# Patient Record
Sex: MALE | Race: WHITE | NOT HISPANIC OR LATINO | Employment: OTHER | URBAN - METROPOLITAN AREA
[De-identification: names, ages, dates, MRNs, and addresses within clinical notes are randomized per-mention and may not be internally consistent; named-entity substitution may affect disease eponyms.]

---

## 2017-01-16 ENCOUNTER — ALLSCRIPTS OFFICE VISIT (OUTPATIENT)
Dept: OTHER | Facility: OTHER | Age: 47
End: 2017-01-16

## 2017-01-16 LAB
BILIRUB UR QL STRIP: NORMAL
CLARITY UR: NORMAL
COLOR UR: YELLOW
HGB UR QL STRIP.AUTO: NORMAL
KETONES UR STRIP-MCNC: NORMAL MG/DL
LEUKOCYTE ESTERASE UR QL STRIP: NORMAL
NITRITE UR QL STRIP: NORMAL
PH UR STRIP.AUTO: 5 [PH]
PROT UR STRIP-MCNC: NORMAL MG/DL
SP GR UR STRIP.AUTO: 1
UROBILINOGEN UR QL STRIP.AUTO: NORMAL

## 2017-01-17 ENCOUNTER — LAB CONVERSION - ENCOUNTER (OUTPATIENT)
Dept: OTHER | Facility: OTHER | Age: 47
End: 2017-01-17

## 2017-01-17 LAB
BACTERIA UR QL AUTO: NORMAL /HPF
BILIRUB UR QL STRIP: NEGATIVE
COLOR UR: YELLOW
COMMENT (HISTORICAL): CLEAR
FECAL OCCULT BLOOD DIAGNOSTIC (HISTORICAL): NEGATIVE
GLUCOSE (HISTORICAL): NEGATIVE
HYALINE CASTS #/AREA URNS LPF: NORMAL /LPF
KETONES UR STRIP-MCNC: NEGATIVE MG/DL
LEUKOCYTE ESTERASE UR QL STRIP: NEGATIVE
NITRITE UR QL STRIP: NEGATIVE
PH UR STRIP.AUTO: 5.5 [PH] (ref 5–8)
PROT UR STRIP-MCNC: NEGATIVE MG/DL
RBC (HISTORICAL): NORMAL /HPF
SP GR UR STRIP.AUTO: 1.02 (ref 1–1.03)
SQUAMOUS EPITHELIAL CELLS (HISTORICAL): NORMAL /HPF
WBC # BLD AUTO: NORMAL /HPF

## 2017-03-08 ENCOUNTER — GENERIC CONVERSION - ENCOUNTER (OUTPATIENT)
Dept: OTHER | Facility: OTHER | Age: 47
End: 2017-03-08

## 2017-03-28 ENCOUNTER — ALLSCRIPTS OFFICE VISIT (OUTPATIENT)
Dept: OTHER | Facility: OTHER | Age: 47
End: 2017-03-28

## 2017-03-28 DIAGNOSIS — M54.16 RADICULOPATHY OF LUMBAR REGION: ICD-10-CM

## 2017-03-28 DIAGNOSIS — C62.90 MALIGNANT NEOPLASM OF TESTIS (HCC): ICD-10-CM

## 2017-03-28 DIAGNOSIS — M54.50 LOW BACK PAIN: ICD-10-CM

## 2017-04-04 ENCOUNTER — TRANSCRIBE ORDERS (OUTPATIENT)
Dept: ADMINISTRATIVE | Facility: HOSPITAL | Age: 47
End: 2017-04-04

## 2017-04-04 ENCOUNTER — HOSPITAL ENCOUNTER (OUTPATIENT)
Dept: RADIOLOGY | Facility: HOSPITAL | Age: 47
Discharge: HOME | End: 2017-04-04
Attending: ANESTHESIOLOGY
Payer: COMMERCIAL

## 2017-04-04 DIAGNOSIS — M54.50 LOW BACK PAIN: ICD-10-CM

## 2017-04-04 DIAGNOSIS — M54.16 RADICULOPATHY OF LUMBAR REGION: ICD-10-CM

## 2017-04-04 DIAGNOSIS — C62.90 MALIGNANT NEOPLASM OF TESTIS (HCC): ICD-10-CM

## 2017-04-04 PROCEDURE — 72110 X-RAY EXAM L-2 SPINE 4/>VWS: CPT

## 2017-04-04 PROCEDURE — 73502 X-RAY EXAM HIP UNI 2-3 VIEWS: CPT

## 2017-04-24 ENCOUNTER — HOSPITAL ENCOUNTER (OUTPATIENT)
Dept: RADIOLOGY | Facility: HOSPITAL | Age: 47
Discharge: HOME | End: 2017-04-24
Attending: ANESTHESIOLOGY
Payer: COMMERCIAL

## 2017-04-24 DIAGNOSIS — C62.90 MALIGNANT NEOPLASM OF TESTIS (HCC): ICD-10-CM

## 2017-04-24 DIAGNOSIS — M54.16 RADICULOPATHY OF LUMBAR REGION: ICD-10-CM

## 2017-04-24 PROCEDURE — 72148 MRI LUMBAR SPINE W/O DYE: CPT

## 2017-04-25 ENCOUNTER — GENERIC CONVERSION - ENCOUNTER (OUTPATIENT)
Dept: OTHER | Facility: OTHER | Age: 47
End: 2017-04-25

## 2017-04-27 ENCOUNTER — ALLSCRIPTS OFFICE VISIT (OUTPATIENT)
Dept: RADIOLOGY | Facility: CLINIC | Age: 47
End: 2017-04-27
Payer: COMMERCIAL

## 2017-04-27 PROCEDURE — 77003 FLUOROGUIDE FOR SPINE INJECT: CPT | Performed by: ANESTHESIOLOGY

## 2017-05-11 ENCOUNTER — ALLSCRIPTS OFFICE VISIT (OUTPATIENT)
Dept: RADIOLOGY | Facility: CLINIC | Age: 47
End: 2017-05-11
Payer: COMMERCIAL

## 2017-06-06 ENCOUNTER — ALLSCRIPTS OFFICE VISIT (OUTPATIENT)
Dept: OTHER | Facility: OTHER | Age: 47
End: 2017-06-06

## 2017-06-06 ENCOUNTER — GENERIC CONVERSION - ENCOUNTER (OUTPATIENT)
Dept: OTHER | Facility: OTHER | Age: 47
End: 2017-06-06

## 2018-01-10 NOTE — MISCELLANEOUS
Provider Comments  Provider Comments:   no show      Signatures   Electronically signed by : Adama Houston DO; Jun 6 2017  8:45AM EST                       (Author)

## 2018-01-11 NOTE — MISCELLANEOUS
Message   Recorded as Task   Date: 06/06/2017 08:40 AM, Created By: Odilon Lauren   Task Name: Miscellaneous   Assigned To: Odilon Lauren   Regarding Patient: Pedro Arnold, Status: Active   CommentLovena Keepers - 06 Jun 2017 8:40 AM     TASK CREATED  Pt was a No Show for today's visit 6/6/17 @ 8:30   LMOM to C/B to R/S   Chuy Eng - 06 Jun 2017 8:45 AM     TASK REPLIED TO: Previously Assigned To Chuy Eng  aware     Signatures   Electronically signed by : Collins Narvaez, ; Jun 6 2017  9:01AM EST                       (Author)

## 2018-01-13 VITALS
BODY MASS INDEX: 22.68 KG/M2 | HEART RATE: 60 BPM | DIASTOLIC BLOOD PRESSURE: 76 MMHG | HEIGHT: 71 IN | WEIGHT: 162 LBS | SYSTOLIC BLOOD PRESSURE: 110 MMHG

## 2018-01-14 VITALS
DIASTOLIC BLOOD PRESSURE: 70 MMHG | BODY MASS INDEX: 22.68 KG/M2 | HEART RATE: 60 BPM | WEIGHT: 162 LBS | SYSTOLIC BLOOD PRESSURE: 110 MMHG | HEIGHT: 71 IN

## 2018-01-14 NOTE — RESULT NOTES
Message   Recorded as Task   Date: 04/25/2017 10:05 AM, Created By: Jo Kyle   Task Name: Care Coordination   Assigned To: [facility] surgery sched,Team   Regarding Patient: Florin Streeter, Status: Active   Comment:    Chuy Eng - 25 Apr 2017 10:05 AM     TASK CREATED  Patient's MRI revealed disc degeneration L4-5 and L5-S1 foraminal narrowing at L5-S1, how is the patient's symptoms, we will consider LESI Ã? 2   Antonia Zamora - 25 Apr 2017 10:25 AM     TASK EDITED  LMOM to cb on home/ cell  Provided cb number  Amelia Arthur - 25 Apr 2017 1:08 PM     TASK EDITED  pt returning call please call pt back at [phone]   Antonia Zamora - 25 Apr 2017 1:43 PM     TASK EDITED  s/w pt, advised of above  pt in agreement  Scheduled LESI #1 on 4/27, #2 on 5/11  Reviewed pre procedure instructions; eat a light meal - npo 1 hour prior, , loose fitting clothing, cb if illness / abx start prior to procedure, pt verbalized understanding and confirmed advil / ibuprofen  Advised pt - stop advil / ibuprofen 24 hrs prior to procedure  Pt verbalized understanding  will cb w/ questions or concerns       Tasking to surg sched team   Alisha Workman - 25 Apr 2017 2:15 PM     TASK REASSIGNED: Previously Assigned To [facility] sched,Team  dx please   Chuy Eng - 25 Apr 2017 2:18 PM     TASK REPLIED TO: Previously Assigned To Chuy Eng  lumbar foraminal stenosis, lumbar radiculitis

## 2020-03-08 ENCOUNTER — HOSPITAL ENCOUNTER (EMERGENCY)
Facility: HOSPITAL | Age: 50
Discharge: HOME | End: 2020-03-08
Attending: EMERGENCY MEDICINE | Admitting: EMERGENCY MEDICINE
Payer: COMMERCIAL

## 2020-03-08 ENCOUNTER — APPOINTMENT (EMERGENCY)
Dept: CT IMAGING | Facility: HOSPITAL | Age: 50
End: 2020-03-08
Payer: COMMERCIAL

## 2020-03-08 VITALS
TEMPERATURE: 97.3 F | OXYGEN SATURATION: 100 % | HEART RATE: 65 BPM | BODY MASS INDEX: 22.59 KG/M2 | DIASTOLIC BLOOD PRESSURE: 72 MMHG | WEIGHT: 162 LBS | RESPIRATION RATE: 20 BRPM | SYSTOLIC BLOOD PRESSURE: 125 MMHG

## 2020-03-08 DIAGNOSIS — R10.9 LEFT FLANK PAIN: ICD-10-CM

## 2020-03-08 DIAGNOSIS — R31.9 HEMATURIA: ICD-10-CM

## 2020-03-08 DIAGNOSIS — N28.1 RENAL CYST, LEFT: Primary | ICD-10-CM

## 2020-03-08 LAB
ALBUMIN SERPL BCP-MCNC: 3.8 G/DL (ref 3.5–5)
ALP SERPL-CCNC: 58 U/L (ref 46–116)
ALT SERPL W P-5'-P-CCNC: 29 U/L (ref 12–78)
ANION GAP SERPL CALCULATED.3IONS-SCNC: 8 MMOL/L (ref 4–13)
AST SERPL W P-5'-P-CCNC: 25 U/L (ref 5–45)
BASOPHILS # BLD AUTO: 0.05 THOUSANDS/ΜL (ref 0–0.1)
BASOPHILS NFR BLD AUTO: 1 % (ref 0–1)
BILIRUB SERPL-MCNC: 1.6 MG/DL (ref 0.2–1)
BUN SERPL-MCNC: 19 MG/DL (ref 5–25)
CALCIUM SERPL-MCNC: 8.6 MG/DL (ref 8.3–10.1)
CHLORIDE SERPL-SCNC: 102 MMOL/L (ref 100–108)
CLARITY, POC: CLEAR
CO2 SERPL-SCNC: 31 MMOL/L (ref 21–32)
COLOR, POC: ABNORMAL
CREAT SERPL-MCNC: 1.02 MG/DL (ref 0.6–1.3)
EOSINOPHIL # BLD AUTO: 0.07 THOUSAND/ΜL (ref 0–0.61)
EOSINOPHIL NFR BLD AUTO: 1 % (ref 0–6)
ERYTHROCYTE [DISTWIDTH] IN BLOOD BY AUTOMATED COUNT: 12 % (ref 11.6–15.1)
EXT BILIRUBIN, UA: ABNORMAL
EXT BLOOD URINE: ABNORMAL
EXT GLUCOSE, UA: ABNORMAL
EXT KETONES: ABNORMAL
EXT NITRITE, UA: ABNORMAL
EXT PH, UA: 6
EXT PROTEIN, UA: ABNORMAL
EXT SPECIFIC GRAVITY, UA: 1.03
EXT UROBILINOGEN: 0.2
GFR SERPL CREATININE-BSD FRML MDRD: 86 ML/MIN/1.73SQ M
GLUCOSE SERPL-MCNC: 96 MG/DL (ref 65–140)
HCT VFR BLD AUTO: 44.9 % (ref 36.5–49.3)
HGB BLD-MCNC: 15.8 G/DL (ref 12–17)
IMM GRANULOCYTES # BLD AUTO: 0.01 THOUSAND/UL (ref 0–0.2)
IMM GRANULOCYTES NFR BLD AUTO: 0 % (ref 0–2)
LYMPHOCYTES # BLD AUTO: 0.95 THOUSANDS/ΜL (ref 0.6–4.47)
LYMPHOCYTES NFR BLD AUTO: 20 % (ref 14–44)
MCH RBC QN AUTO: 33.2 PG (ref 26.8–34.3)
MCHC RBC AUTO-ENTMCNC: 35.2 G/DL (ref 31.4–37.4)
MCV RBC AUTO: 94 FL (ref 82–98)
MONOCYTES # BLD AUTO: 0.76 THOUSAND/ΜL (ref 0.17–1.22)
MONOCYTES NFR BLD AUTO: 16 % (ref 4–12)
NEUTROPHILS # BLD AUTO: 2.99 THOUSANDS/ΜL (ref 1.85–7.62)
NEUTS SEG NFR BLD AUTO: 62 % (ref 43–75)
NRBC BLD AUTO-RTO: 0 /100 WBCS
PLATELET # BLD AUTO: 221 THOUSANDS/UL (ref 149–390)
PMV BLD AUTO: 9.7 FL (ref 8.9–12.7)
POTASSIUM SERPL-SCNC: 4 MMOL/L (ref 3.5–5.3)
PROT SERPL-MCNC: 7.3 G/DL (ref 6.4–8.2)
RBC # BLD AUTO: 4.76 MILLION/UL (ref 3.88–5.62)
SODIUM SERPL-SCNC: 141 MMOL/L (ref 136–145)
WBC # BLD AUTO: 4.83 THOUSAND/UL (ref 4.31–10.16)
WBC # BLD EST: ABNORMAL 10*3/UL

## 2020-03-08 PROCEDURE — 74176 CT ABD & PELVIS W/O CONTRAST: CPT

## 2020-03-08 PROCEDURE — 99284 EMERGENCY DEPT VISIT MOD MDM: CPT | Performed by: PHYSICIAN ASSISTANT

## 2020-03-08 PROCEDURE — 96360 HYDRATION IV INFUSION INIT: CPT

## 2020-03-08 PROCEDURE — 99284 EMERGENCY DEPT VISIT MOD MDM: CPT

## 2020-03-08 PROCEDURE — 85025 COMPLETE CBC W/AUTO DIFF WBC: CPT | Performed by: EMERGENCY MEDICINE

## 2020-03-08 PROCEDURE — 80053 COMPREHEN METABOLIC PANEL: CPT | Performed by: EMERGENCY MEDICINE

## 2020-03-08 PROCEDURE — 36415 COLL VENOUS BLD VENIPUNCTURE: CPT | Performed by: EMERGENCY MEDICINE

## 2020-03-08 RX ORDER — METHOCARBAMOL 500 MG/1
500 TABLET, FILM COATED ORAL 3 TIMES DAILY
Qty: 20 TABLET | Refills: 0 | Status: SHIPPED | OUTPATIENT
Start: 2020-03-08 | End: 2020-04-28 | Stop reason: ALTCHOICE

## 2020-03-08 RX ORDER — IBUPROFEN 600 MG/1
1 TABLET ORAL DAILY
COMMUNITY
End: 2020-04-28 | Stop reason: ALTCHOICE

## 2020-03-08 RX ADMIN — SODIUM CHLORIDE 1000 ML: 0.9 INJECTION, SOLUTION INTRAVENOUS at 13:42

## 2020-03-08 NOTE — DISCHARGE INSTRUCTIONS
Rest, increase fluids  Take robaxin 3 times a day for muscle spasm    Call urologist tomorrow for further evaluation of hematuria and renal cyst

## 2020-03-08 NOTE — ED PROVIDER NOTES
History  Chief Complaint   Patient presents with    Flank Pain     To ED with c/o left flank pain for one week  States that he has had a weak urinary stream  Denies any fever or chills  Patient is a 53 y/o M that presents to the ED with left flank pain that started 1 week ago  He states a couple days ago he had vomiting and diarrhea and thought  He just pulled a muscle, but now the pain is radiating around to abdomen  Laying on his side makes the pain worse  He has been taking advil for the pain which doesn't seem to help  He denies urinary symptoms  No fevers, chills  History provided by:  Patient  Flank Pain   Pain location:  L flank  Pain quality: aching    Pain radiates to:  LLQ  Pain severity:  Moderate  Onset quality:  Gradual  Duration:  1 week  Timing:  Constant  Progression:  Worsening  Chronicity:  New  Context: not sick contacts, not suspicious food intake and not trauma    Relieved by:  Nothing  Worsened by:  Palpation and position changes  Ineffective treatments:  NSAIDs  Associated symptoms: diarrhea, nausea and vomiting    Associated symptoms: no anorexia, no chest pain, no chills, no constipation, no cough, no dysuria, no fever and no shortness of breath    Risk factors: no alcohol abuse, no aspirin use, not elderly, has not had multiple surgeries, no NSAID use, not obese and no recent hospitalization        Prior to Admission Medications   Prescriptions Last Dose Informant Patient Reported? Taking?   ibuprofen (MOTRIN) 600 mg tablet   Yes No   Sig: Take 1 tablet by mouth daily      Facility-Administered Medications: None       History reviewed  No pertinent past medical history  History reviewed  No pertinent surgical history  History reviewed  No pertinent family history  I have reviewed and agree with the history as documented      E-Cigarette/Vaping    E-Cigarette Use Never User      E-Cigarette/Vaping Substances    Nicotine No     Flavoring No      Social History Tobacco Use    Smoking status: Never Smoker    Smokeless tobacco: Never Used   Substance Use Topics    Alcohol use: Not on file    Drug use: Never       Review of Systems   Constitutional: Negative for chills and fever  HENT: Negative  Respiratory: Negative for cough and shortness of breath  Cardiovascular: Negative for chest pain and leg swelling  Gastrointestinal: Positive for abdominal pain, diarrhea, nausea and vomiting  Negative for anorexia and constipation  Genitourinary: Positive for flank pain  Negative for dysuria  Skin: Negative for color change and rash  Neurological: Negative for dizziness, weakness and numbness  Psychiatric/Behavioral: Negative for confusion  All other systems reviewed and are negative  Physical Exam  Physical Exam   Constitutional: He is oriented to person, place, and time  He appears well-developed and well-nourished  He is cooperative  He does not appear ill  No distress  HENT:   Head: Normocephalic and atraumatic  Nose: Nose normal    Mouth/Throat: Oropharynx is clear and moist and mucous membranes are normal    Eyes: Conjunctivae are normal    Neck: Normal range of motion  Cardiovascular: Normal rate, regular rhythm and normal heart sounds  No murmur heard  Pulmonary/Chest: Effort normal and breath sounds normal  He has no wheezes  He has no rhonchi  He has no rales  Abdominal: Soft  Normal appearance and bowel sounds are normal  There is no tenderness  There is CVA tenderness  Musculoskeletal: Normal range of motion  He exhibits no edema  Thoracic back: He exhibits tenderness  He exhibits no bony tenderness and no swelling  Back:    Neurological: He is alert and oriented to person, place, and time  He has normal strength  No sensory deficit  Gait normal    Skin: Skin is warm and dry  No rash noted  He is not diaphoretic  No pallor  Psychiatric: He has a normal mood and affect     Nursing note and vitals reviewed        Vital Signs  ED Triage Vitals [03/08/20 1230]   Temperature Pulse Respirations Blood Pressure SpO2   (!) 97 3 °F (36 3 °C) 65 20 125/72 100 %      Temp Source Heart Rate Source Patient Position - Orthostatic VS BP Location FiO2 (%)   Tympanic Monitor Sitting Right arm --      Pain Score       7           Vitals:    03/08/20 1230   BP: 125/72   Pulse: 65   Patient Position - Orthostatic VS: Sitting         Visual Acuity      ED Medications  Medications   sodium chloride 0 9 % bolus 1,000 mL (0 mL Intravenous Stopped 3/8/20 1429)       Diagnostic Studies  Results Reviewed     Procedure Component Value Units Date/Time    Comprehensive metabolic panel [[id number]]  (Abnormal) Collected:  03/08/20 1324    Lab Status:  Final result Specimen:  Blood from Arm, Right Updated:  03/08/20 1354     Sodium 141 mmol/L      Potassium 4 0 mmol/L      Chloride 102 mmol/L      CO2 31 mmol/L      ANION GAP 8 mmol/L      BUN 19 mg/dL      Creatinine 1 02 mg/dL      Glucose 96 mg/dL      Calcium 8 6 mg/dL      AST 25 U/L      ALT 29 U/L      Alkaline Phosphatase 58 U/L      Total Protein 7 3 g/dL      Albumin 3 8 g/dL      Total Bilirubin 1 60 mg/dL      eGFR 86 ml/min/1 73sq m     Narrative:       [place] guidelines for Chronic Kidney Disease (CKD):     Stage 1 with normal or high GFR (GFR > 90 mL/min/1 73 square meters)    Stage 2 Mild CKD (GFR = 60-89 mL/min/1 73 square meters)    Stage 3A Moderate CKD (GFR = 45-59 mL/min/1 73 square meters)    Stage 3B Moderate CKD (GFR = 30-44 mL/min/1 73 square meters)    Stage 4 Severe CKD (GFR = 15-29 mL/min/1 73 square meters)    Stage 5 End Stage CKD (GFR <15 mL/min/1 73 square meters)  Note: GFR calculation is accurate only with a steady state creatinine    CBC and differential [[id number]]  (Abnormal) Collected:  03/08/20 1324    Lab Status:  Final result Specimen:  Blood from Arm, Right Updated:  03/08/20 1333     WBC 4 83 Thousand/uL      RBC 4 76 Million/uL      Hemoglobin 15 8 g/dL      Hematocrit 44 9 %      MCV 94 fL      MCH 33 2 pg      MCHC 35 2 g/dL      RDW 12 0 %      MPV 9 7 fL      Platelets 763 Thousands/uL      nRBC 0 /100 WBCs      Neutrophils Relative 62 %      Immat GRANS % 0 %      Lymphocytes Relative 20 %      Monocytes Relative 16 %      Eosinophils Relative 1 %      Basophils Relative 1 %      Neutrophils Absolute 2 99 Thousands/µL      Immature Grans Absolute 0 01 Thousand/uL      Lymphocytes Absolute 0 95 Thousands/µL      Monocytes Absolute 0 76 Thousand/µL      Eosinophils Absolute 0 07 Thousand/µL      Basophils Absolute 0 05 Thousands/µL     POCT urinalysis dipstick [[id number]]  (Abnormal) Resulted:  03/08/20 1331    Lab Status:  Final result Specimen:  Urine Updated:  03/08/20 1332     Color, UA STRAW     Clarity, UA CLEAR     Glucose, UA (Ref: Negative) NEG     Bilirubin, UA (Ref: Negative) NEG     Ketones, UA (Ref: Negative) NEG     Spec Grav, UA (Ref:1 003-1 030) 1 030     Blood, UA (Ref: Negative) LARGE     pH, UA (Ref: 4 5-8 0) 6 0     Protein, UA (Ref: Negative) TRACE     Urobilinogen, UA (Ref: 0 2- 1 0) 0 2      Leukocytes, UA (Ref: Negative) NEG     Nitrite, UA (Ref: Negative) NEG                 CT renal stone study abdomen pelvis without contrast   Final Result by David Ortega MD (03/08 1359)      No evidence of calculi or hydronephrosis  Left renal cyst with some thin linear area of calcification  Left colon diverticulosis without CT evidence of diverticulitis  Contrast-enhanced study could be considered as elective follow-up especially if hematuria persists  The study was marked in EPIC for significant notification              Workstation performed: [id number]                    Procedures  Procedures         ED Course                               MDM  Number of Diagnoses or Management Options  Hematuria: new and requires workup  Left flank pain: new and requires workup  Renal cyst, left: established and worsening  Diagnosis management comments: Patient with left flank pain, will order labs, UA, CT to r/o stone or pyelo  Patient with renal cyst and hematuria, stressed importance of f/u with urology  Will give muscle relaxant since palpation does reproduce symptoms  Amount and/or Complexity of Data Reviewed  Clinical lab tests: ordered and reviewed  Tests in the radiology section of CPT®: ordered and reviewed    Patient Progress  Patient progress: stable        Disposition  Final diagnoses:   Renal cyst, left   Hematuria   Left flank pain     Time reflects when diagnosis was documented in both MDM as applicable and the Disposition within this note     Time User Action Codes Description Comment    3/8/2020  2:07 PM Miguel How Add [N28 1] Renal cyst, left     3/8/2020  2:07 PM Miguel How Add [R31 9] Hematuria     3/8/2020  2:21 PM Miguel How Add [R10 9] Left flank pain       ED Disposition     ED Disposition Condition Date/Time Comment    Discharge Stable Sun Mar 8, 2020  2:21 PM lBake Elliott discharge to home/self care  Follow-up Information     Follow up With Specialties Details Why Contact Info Additional [address] [facility] [place] For Urology [facility] Urology Call in 1 day For recheck or renal cyst and hematuria [address] [id number] Aakash SAM[facility] [id number]  [address]  [facility] For Urology [facility], [facility] [address], [facility], [address] [facility], [facility] [address]          Discharge Medication List as of 3/8/2020  2:22 PM      START taking these medications    Details   methocarbamol (ROBAXIN) 500 mg tablet Take 1 tablet (500 mg total) by mouth 3 (three) times a day, Starting Sun 3/8/2020, Normal         CONTINUE these medications which have NOT CHANGED    Details   ibuprofen (MOTRIN) 600 mg tablet Take 1 tablet by mouth daily, Historical Med           No discharge procedures on file      PDMP Review     None ED Provider  Electronically Signed by           Suzanne Greco PA-C  03/08/20 2167

## 2020-03-09 ENCOUNTER — TELEPHONE (OUTPATIENT)
Dept: UROLOGY | Facility: AMBULATORY SURGERY CENTER | Age: 50
End: 2020-03-09

## 2020-03-09 NOTE — TELEPHONE ENCOUNTER
Reason for appointment/Complaint/Diagnosis :er follow up renal cyst  Insurance: [id number] [address]  History of Cancer? yes                 If yes, what kind? testicular    Previous urologist?   kousari             Records requested/where?  no  Outside testing/where? no  Location Preference for office visit? [facility] or [facility]

## 2020-03-09 NOTE — TELEPHONE ENCOUNTER
Routing to clerical to please call and schedule soon with Dr Donny Francisco or Dr Rosario Braun  Thanks

## 2020-03-10 ENCOUNTER — APPOINTMENT (OUTPATIENT)
Dept: RADIOLOGY | Facility: CLINIC | Age: 50
End: 2020-03-10
Payer: COMMERCIAL

## 2020-03-10 ENCOUNTER — HOSPITAL ENCOUNTER (OUTPATIENT)
Dept: MRI IMAGING | Facility: HOSPITAL | Age: 50
Discharge: HOME | End: 2020-03-10
Attending: FAMILY MEDICINE
Payer: COMMERCIAL

## 2020-03-10 VITALS
BODY MASS INDEX: 22.68 KG/M2 | SYSTOLIC BLOOD PRESSURE: 115 MMHG | WEIGHT: 162 LBS | HEIGHT: 71 IN | DIASTOLIC BLOOD PRESSURE: 70 MMHG

## 2020-03-10 DIAGNOSIS — S76.112A STRAIN OF QUADRICEPS TENDON, LEFT, INITIAL ENCOUNTER: ICD-10-CM

## 2020-03-10 DIAGNOSIS — M16.12 PRIMARY OSTEOARTHRITIS OF ONE HIP, LEFT: ICD-10-CM

## 2020-03-10 DIAGNOSIS — M79.605 LEFT LEG PAIN: ICD-10-CM

## 2020-03-10 DIAGNOSIS — M79.605 LEFT LEG PAIN: Primary | ICD-10-CM

## 2020-03-10 PROCEDURE — 3008F BODY MASS INDEX DOCD: CPT | Performed by: FAMILY MEDICINE

## 2020-03-10 PROCEDURE — 73552 X-RAY EXAM OF FEMUR 2/>: CPT

## 2020-03-10 PROCEDURE — 73721 MRI JNT OF LWR EXTRE W/O DYE: CPT

## 2020-03-10 PROCEDURE — 99204 OFFICE O/P NEW MOD 45 MIN: CPT | Performed by: FAMILY MEDICINE

## 2020-03-10 PROCEDURE — 1036F TOBACCO NON-USER: CPT | Performed by: FAMILY MEDICINE

## 2020-03-10 NOTE — PROGRESS NOTES
Assessment:     1  Left leg pain    2  Strain of quadriceps tendon, left, initial encounter    3  Primary osteoarthritis of one hip, left        Plan:     Problem List Items Addressed This Visit        Musculoskeletal and Integument    Strain of quadriceps tendon, left, initial encounter    Relevant Orders    MRI knee left  wo contrast    Primary osteoarthritis of one hip, left      Other Visit Diagnoses     Left leg pain    -  Primary    Relevant Orders    XR femur 2 vw left    MRI knee left  wo contrast         Subjective:     Patient ID: Mansi Sibley is a 52 y o  male  Chief Complaint:  Patient is a 31-year-old male presenting today for evaluation of left thigh pain  He reports 3 weeks ago while playing soccer, feeling a pop in his left leg while sprinting for the ball  He reported immediate onset of pain along the distal aspect of his thigh  Shortly thereafter he reported enormous amount of swelling and edema followed by bruising just above his kneecap  Since that time he has had difficulty with climbing stairs and also with getting into and out of his truck  Pain continues to be located just above his kneecap and also along the medial aspect of his thigh  Ice and anti-inflammatories have helped with swelling  He states the bruising has now resolved  He denies any knee locking, clicking giving out  He denies any numbness or tingling  Allergy:  No Known Allergies  Medications:  all current active meds have been reviewed  Past Medical History:  History reviewed  No pertinent past medical history  Past Surgical History:  History reviewed  No pertinent surgical history  Family History:  History reviewed  No pertinent family history    Social History:  Social History     Substance and Sexual Activity   Alcohol Use Not on file     Social History     Substance and Sexual Activity   Drug Use Never     Social History     Tobacco Use   Smoking Status Never Smoker   Smokeless Tobacco Never Used     Review of Systems   Constitutional: Negative  HENT: Negative  Eyes: Negative  Respiratory: Negative  Cardiovascular: Negative  Gastrointestinal: Negative  Genitourinary: Negative  Musculoskeletal: Positive for arthralgias and myalgias  Skin: Negative  Allergic/Immunologic: Negative  Neurological: Negative  Hematological: Negative  Psychiatric/Behavioral: Negative  Objective:  BP Readings from Last 1 Encounters:   03/10/20 115/70      Wt Readings from Last 1 Encounters:   03/10/20 73 5 kg (162 lb)      BMI:   Estimated body mass index is 22 59 kg/m² as calculated from the following:    Height as of this encounter: 5' 11" (1 803 m)  Weight as of this encounter: 73 5 kg (162 lb)  BSA:   Estimated body surface area is 1 93 meters squared as calculated from the following:    Height as of this encounter: 5' 11" (1 803 m)  Weight as of this encounter: 73 5 kg (162 lb)  Physical Exam   Constitutional: He appears well-developed  Eyes: Pupils are equal, round, and reactive to light  Neck: Normal range of motion  Pulmonary/Chest: Effort normal    Neurological: He is alert  Skin: Skin is warm  Psychiatric: He has a normal mood and affect  Left Hip Exam     Tenderness   The patient is experiencing no tenderness  Range of Motion   External rotation: normal   Internal rotation: abnormal     Muscle Strength   The patient has normal left hip strength  Tests   LYNN: negative    Other   Erythema: absent  Sensation: normal  Pulse: present    Comments:  Palpable tenderness along the quadriceps tendon  There is a noted voidance along the VMO muscle group            I have personally reviewed pertinent films in PACS

## 2020-03-11 ENCOUNTER — OFFICE VISIT (OUTPATIENT)
Dept: UROLOGY | Facility: MEDICAL CENTER | Age: 50
End: 2020-03-11
Payer: COMMERCIAL

## 2020-03-11 VITALS
BODY MASS INDEX: 22.68 KG/M2 | SYSTOLIC BLOOD PRESSURE: 120 MMHG | HEIGHT: 71 IN | WEIGHT: 162 LBS | HEART RATE: 59 BPM | DIASTOLIC BLOOD PRESSURE: 68 MMHG

## 2020-03-11 DIAGNOSIS — R10.9 FLANK PAIN: ICD-10-CM

## 2020-03-11 DIAGNOSIS — R39.12 WEAK URINARY STREAM: ICD-10-CM

## 2020-03-11 DIAGNOSIS — Z12.5 PROSTATE CANCER SCREENING: ICD-10-CM

## 2020-03-11 DIAGNOSIS — N28.1 RENAL CYST: Primary | ICD-10-CM

## 2020-03-11 DIAGNOSIS — R31.29 MICROSCOPIC HEMATURIA: ICD-10-CM

## 2020-03-11 DIAGNOSIS — Z85.47 HISTORY OF TESTICULAR CANCER: ICD-10-CM

## 2020-03-11 LAB
SL AMB  POCT GLUCOSE, UA: ABNORMAL
SL AMB LEUKOCYTE ESTERASE,UA: ABNORMAL
SL AMB POCT BILIRUBIN,UA: ABNORMAL
SL AMB POCT BLOOD,UA: ABNORMAL
SL AMB POCT CLARITY,UA: CLEAR
SL AMB POCT COLOR,UA: YELLOW
SL AMB POCT KETONES,UA: ABNORMAL
SL AMB POCT NITRITE,UA: ABNORMAL
SL AMB POCT PH,UA: 6
SL AMB POCT SPECIFIC GRAVITY,UA: 1.02
SL AMB POCT URINE PROTEIN: ABNORMAL
SL AMB POCT UROBILINOGEN: 0.2

## 2020-03-11 PROCEDURE — 81003 URINALYSIS AUTO W/O SCOPE: CPT | Performed by: UROLOGY

## 2020-03-11 PROCEDURE — 99244 OFF/OP CNSLTJ NEW/EST MOD 40: CPT | Performed by: UROLOGY

## 2020-03-11 NOTE — PROGRESS NOTES
Assessment/Plan:  1  Left flank pain -probably musculoskeletal   Continue local therapy and therapy as per chiroopractor  2  Microscopic hematuria- my review of the CT without contrast revealed calcification in the region of the mid ureter however more than likely this is outside of the ureter  Because I am unable to see the ureter and because with the patient having microscopic hematuria CT with and without contrast will be repeated to see whether this calcification is indeed in the urinary tract  This will give us another opportune due to look at the lymph node chain in the retroperitoneum and the urinary tract in view of his microscopic blood in the urine  3 Bilateral renal cyst-no intervention  4  History of testicular cancer -patient asked to access his pathology report as well as obtain beta HCG alpha fetoprotein blood levels  5   Prostate cancer screening  The patient will be 48years of age soon and requested PSA and TALA  TALA is not suspicious today with PSA ordered  6  Weakened urinary stream -chronic-plan cystoscopy  No problem-specific Assessment & Plan notes found for this encounter  Diagnoses and all orders for this visit:    Renal cyst  -     POCT urine dip auto non-scope    Microscopic hematuria  -     POCT urine dip auto non-scope    Flank pain  Comments:   left  Orders:  -     POCT urine dip auto non-scope    History of testicular cancer    Prostate cancer screening  Comments:  by patient request          Subjective:      Patient ID: Reynaldo Ferreira is a 52 y o  male  HPI   66-year-old male who in 2000 was 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 at [facility] with the patient receiving radiation therapy to the retroperitoneum  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 however we did discuss the fact that radiation is an odd choice of therapy for a non seminoma this tumor    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  In any event the patient noted that for approximately 2 weeks he had some right flank discomfort exacerbated by movement 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 or even related to exercises in the gym  Pain is worse with twisting at the level of the waist   The patient noted that the pain became severe 4 days ago and presented to the emergency room at which time CT without contrast was performed revealing no evidence of hydronephrosis or renal stones  Incidental finding of simple renal cysts bilaterally was noted  Patient now presents for evaluation flank pain on the left microscopic hematuria history of testicular cancer  The patient is also interested in elective sterilization with a right-sided vasectomy  This will be discussed at a later time  The patient also notes that since the year 2000 he has had somewhat of a weakened urinary  Stream noting a protracted stream as well  Bladder outlet will be evaluated with cystoscopy  The following portions of the patient's history were reviewed and updated as appropriate: allergies, current medications, past family history, past medical history, past social history, past surgical history and problem list     Review of Systems   Respiratory: Negative  Gastrointestinal:         Left flank pain radiating somewhat anteriorly   Musculoskeletal: Positive for back pain and myalgias  Neurological: Negative  Hematological: Negative  Psychiatric/Behavioral: Negative  All other systems reviewed and are negative  Objective:      /68   Pulse 59   Ht 5' 11" (1 803 m)   Wt 73 5 kg (162 lb)   BMI 22 59 kg/m²          Physical Exam   Constitutional: He is oriented to person, place, and time  He appears well-developed and well-nourished  No distress  HENT:   Head: Normocephalic and atraumatic  Eyes: Pupils are equal, round, and reactive to light  EOM are normal    Neck: Neck supple     Cardiovascular: Normal rate  Pulmonary/Chest: Effort normal  No respiratory distress  Abdominal: Soft  He exhibits no distension  No pain or tenderness to percussion  Genitourinary:   Genitourinary Comments: Phallus normal circumcised without cutaneous lesions  Meatus patent normally placed  Scrotum normal without cutaneous lesions  There is a surgically absent left testis with the right testis being nontender without significant adnexal abnormality or palpable mass  TALA normal anal verge normal anal sphincter tone no palpable rectal masses  Prostate 20 g a nodular nontender   Musculoskeletal: Normal range of motion  Neurological: He is alert and oriented to person, place, and time  Skin: Skin is dry  He is not diaphoretic  Psychiatric: He has a normal mood and affect  His behavior is normal  Judgment and thought content normal    Vitals reviewed

## 2020-03-12 ENCOUNTER — OFFICE VISIT (OUTPATIENT)
Dept: OBGYN CLINIC | Facility: CLINIC | Age: 50
End: 2020-03-12
Payer: COMMERCIAL

## 2020-03-12 VITALS
HEIGHT: 71 IN | SYSTOLIC BLOOD PRESSURE: 119 MMHG | BODY MASS INDEX: 22.68 KG/M2 | WEIGHT: 162 LBS | DIASTOLIC BLOOD PRESSURE: 70 MMHG

## 2020-03-12 DIAGNOSIS — S76.112A STRAIN OF QUADRICEPS TENDON, LEFT, INITIAL ENCOUNTER: Primary | ICD-10-CM

## 2020-03-12 PROCEDURE — 3008F BODY MASS INDEX DOCD: CPT | Performed by: FAMILY MEDICINE

## 2020-03-12 PROCEDURE — 1036F TOBACCO NON-USER: CPT | Performed by: FAMILY MEDICINE

## 2020-03-12 PROCEDURE — 99213 OFFICE O/P EST LOW 20 MIN: CPT | Performed by: FAMILY MEDICINE

## 2020-03-12 NOTE — PROGRESS NOTES
Assessment:     1  Strain of quadriceps tendon, left, initial encounter        Plan:     Problem List Items Addressed This Visit        Musculoskeletal and Integument    Strain of quadriceps tendon, left, initial encounter - Primary    Relevant Orders    Ambulatory referral to Physical Therapy         Subjective:     Patient ID: Angelica Holley is a 52 y o  male  Chief Complaint:  Patient presents today for follow-up of 5 pain and MRI results  Patient reports good overall relief of pain in his left thigh  He is now able to fully bend the leg which was unable to do previously  He states that this morning he was able to walk on a roof with no difficulties  He does report at this time some unrelated pain his left lower back not related to his current leg injury  Allergy:  No Known Allergies  Medications:  all current active meds have been reviewed  Past Medical History:  History reviewed  No pertinent past medical history  Past Surgical History:  History reviewed  No pertinent surgical history  Family History:  History reviewed  No pertinent family history  Social History:  Social History     Substance and Sexual Activity   Alcohol Use Not on file     Social History     Substance and Sexual Activity   Drug Use Never     Social History     Tobacco Use   Smoking Status Never Smoker   Smokeless Tobacco Never Used     Review of Systems   Constitutional: Negative  HENT: Negative  Eyes: Negative  Respiratory: Negative  Cardiovascular: Negative  Gastrointestinal: Negative  Genitourinary: Negative  Musculoskeletal: Positive for arthralgias and myalgias  Skin: Negative  Allergic/Immunologic: Negative  Neurological: Negative  Hematological: Negative  Psychiatric/Behavioral: Negative            Objective:  BP Readings from Last 1 Encounters:   03/12/20 119/70      Wt Readings from Last 1 Encounters:   03/12/20 73 5 kg (162 lb)      BMI:   Estimated body mass index is 22 59 kg/m² as calculated from the following:    Height as of this encounter: 5' 11" (1 803 m)  Weight as of this encounter: 73 5 kg (162 lb)  BSA:   Estimated body surface area is 1 93 meters squared as calculated from the following:    Height as of this encounter: 5' 11" (1 803 m)  Weight as of this encounter: 73 5 kg (162 lb)  Physical Exam   Constitutional: He is oriented to person, place, and time  He appears well-developed and well-nourished  HENT:   Head: Normocephalic  Eyes: Pupils are equal, round, and reactive to light  Neck: Normal range of motion  Pulmonary/Chest: Effort normal    Musculoskeletal: Normal range of motion  Neurological: He is alert and oriented to person, place, and time  Skin: Skin is warm  Psychiatric: He has a normal mood and affect  Left Hip Exam     Tenderness   The patient is experiencing no tenderness  Range of Motion   External rotation: normal     Muscle Strength   The patient has normal left hip strength  Tests   LYNN: negative    Other   Erythema: absent  Sensation: normal  Pulse: present    Comments:  Palpable tenderness along the quadriceps tendon  There is a noted voidance along the VMO muscle group            I have personally reviewed pertinent films in PACS  Mild quadriceps insertional tendinosis otherwise intact

## 2020-03-17 ENCOUNTER — OFFICE VISIT (OUTPATIENT)
Dept: FAMILY MEDICINE CLINIC | Facility: CLINIC | Age: 50
End: 2020-03-17
Payer: COMMERCIAL

## 2020-03-17 ENCOUNTER — APPOINTMENT (OUTPATIENT)
Dept: LAB | Facility: CLINIC | Age: 50
End: 2020-03-17
Payer: COMMERCIAL

## 2020-03-17 ENCOUNTER — EVALUATION (OUTPATIENT)
Dept: PHYSICAL THERAPY | Facility: CLINIC | Age: 50
End: 2020-03-17
Payer: COMMERCIAL

## 2020-03-17 VITALS
DIASTOLIC BLOOD PRESSURE: 60 MMHG | SYSTOLIC BLOOD PRESSURE: 118 MMHG | HEART RATE: 68 BPM | RESPIRATION RATE: 16 BRPM | HEIGHT: 71 IN | WEIGHT: 164.5 LBS | BODY MASS INDEX: 23.03 KG/M2

## 2020-03-17 DIAGNOSIS — Z12.5 PROSTATE CANCER SCREENING: ICD-10-CM

## 2020-03-17 DIAGNOSIS — Z00.00 ANNUAL PHYSICAL EXAM: Primary | ICD-10-CM

## 2020-03-17 DIAGNOSIS — Z13.220 SCREENING, LIPID: ICD-10-CM

## 2020-03-17 DIAGNOSIS — Z12.11 ENCOUNTER FOR SCREENING FOR MALIGNANT NEOPLASM OF COLON: ICD-10-CM

## 2020-03-17 DIAGNOSIS — S76.112A STRAIN OF QUADRICEPS TENDON, LEFT, INITIAL ENCOUNTER: ICD-10-CM

## 2020-03-17 DIAGNOSIS — Z85.47 HISTORY OF TESTICULAR CANCER: ICD-10-CM

## 2020-03-17 PROBLEM — M16.12 PRIMARY OSTEOARTHRITIS OF ONE HIP, LEFT: Status: RESOLVED | Noted: 2020-03-10 | Resolved: 2020-03-17

## 2020-03-17 LAB
AFP-TM SERPL-MCNC: 3.5 NG/ML (ref 0.5–8)
ALBUMIN SERPL BCP-MCNC: 3.8 G/DL (ref 3.5–5)
ALP SERPL-CCNC: 57 U/L (ref 46–116)
ALT SERPL W P-5'-P-CCNC: 46 U/L (ref 12–78)
ANION GAP SERPL CALCULATED.3IONS-SCNC: 4 MMOL/L (ref 4–13)
AST SERPL W P-5'-P-CCNC: 25 U/L (ref 5–45)
B-HCG SERPL-ACNC: <2 MIU/ML
BILIRUB SERPL-MCNC: 0.65 MG/DL (ref 0.2–1)
BUN SERPL-MCNC: 24 MG/DL (ref 5–25)
CALCIUM SERPL-MCNC: 8.7 MG/DL (ref 8.3–10.1)
CHLORIDE SERPL-SCNC: 108 MMOL/L (ref 100–108)
CHOLEST SERPL-MCNC: 144 MG/DL (ref 50–200)
CO2 SERPL-SCNC: 29 MMOL/L (ref 21–32)
CREAT SERPL-MCNC: 0.89 MG/DL (ref 0.6–1.3)
GFR SERPL CREATININE-BSD FRML MDRD: 100 ML/MIN/1.73SQ M
GLUCOSE P FAST SERPL-MCNC: 82 MG/DL (ref 65–99)
HDLC SERPL-MCNC: 51 MG/DL
LDLC SERPL CALC-MCNC: 80 MG/DL (ref 0–100)
POTASSIUM SERPL-SCNC: 4.2 MMOL/L (ref 3.5–5.3)
PROT SERPL-MCNC: 7 G/DL (ref 6.4–8.2)
PSA SERPL-MCNC: 0.5 NG/ML (ref 0–4)
SODIUM SERPL-SCNC: 141 MMOL/L (ref 136–145)
TRIGL SERPL-MCNC: 63 MG/DL

## 2020-03-17 PROCEDURE — 84702 CHORIONIC GONADOTROPIN TEST: CPT

## 2020-03-17 PROCEDURE — 36415 COLL VENOUS BLD VENIPUNCTURE: CPT

## 2020-03-17 PROCEDURE — 84153 ASSAY OF PSA TOTAL: CPT

## 2020-03-17 PROCEDURE — 80061 LIPID PANEL: CPT

## 2020-03-17 PROCEDURE — 99396 PREV VISIT EST AGE 40-64: CPT | Performed by: PHYSICIAN ASSISTANT

## 2020-03-17 PROCEDURE — 82105 ALPHA-FETOPROTEIN SERUM: CPT

## 2020-03-17 PROCEDURE — 80053 COMPREHEN METABOLIC PANEL: CPT

## 2020-03-17 PROCEDURE — 97161 PT EVAL LOW COMPLEX 20 MIN: CPT

## 2020-03-17 NOTE — PROGRESS NOTES
ADULT ANNUAL PHYSICAL  [address]  FAMILY PRACTICE    NAME: Ana María Blandon  AGE: 48 y o  SEX: male  : 1970     DATE: 3/17/2020     Assessment and Plan:     Healthy 48year old male    Immunizations and preventive care screenings were discussed with patient today  Appropriate education was printed on patient's after visit summary  Counseling:  Alcohol/drug use: discussed moderation in alcohol intake, the recommendations for healthy alcohol use, and avoidance of illicit drug use  Dental Health: discussed importance of regular tooth brushing, flossing, and dental visits  Injury prevention: discussed safety/seat belts, safety helmets, smoke detectors, carbon dioxide detectors, and smoking near bedding or upholstery  Sexual health: discussed sexually transmitted diseases, partner selection, use of condoms, avoidance of unintended pregnancy, and contraceptive alternatives  · Exercise: the importance of regular exercise/physical activity was discussed  Recommend exercise 3-5 times per week for at least 30 minutes  Return in 1 year (on 3/17/2021)  Chief Complaint:     Chief Complaint   Patient presents with    Physical Exam      History of Present Illness:     Adult Annual Physical   Patient here for a comprehensive physical exam  The patient reports problems - under the care of Dr Jani Rutledge for microscopic hematuria  Diet and Physical Activity  · Diet/Nutrition: well balanced diet  · Exercise: moderate cardiovascular exercise, 3-4 times a week on average and 30-60 minutes on average        Depression Screening  PHQ-9 Depression Screening    PHQ-9:    Frequency of the following problems over the past two weeks:       Little interest or pleasure in doing things:  0 - not at all  Feeling down, depressed, or hopeless:  0 - not at all  PHQ-2 Score:  0       General Health  · Sleep: sleeps well, gets 7-8 hours of sleep on average and gets more than 8 hours of sleep on average  · Hearing: normal - bilateral   · Vision: goes for regular eye exams and most recent eye exam <1 year ago  · Dental: regular dental visits and brushes teeth twice daily   Health  · Symptoms include: none     Review of Systems:     Review of Systems   Constitutional: Negative  HENT: Negative  Eyes: Negative  Respiratory: Negative  Cardiovascular: Negative  Gastrointestinal: Negative  Endocrine: Negative  Genitourinary: Negative  Musculoskeletal: Negative  Skin: Negative  Allergic/Immunologic: Negative  Neurological: Negative  Hematological: Negative  Psychiatric/Behavioral: Negative         Past Medical History:     Past Medical History:   Diagnosis Date    Testicular cancer ([facility] [address] )     Left      Past Surgical History:     Past Surgical History:   Procedure Laterality Date    DENTAL SURGERY      [place] teeth extraction    TESTICLE SURGERY      Left      Family History:     Family History   Problem Relation Age of Onset    Endometriosis Mother     Alcohol abuse Sister     Substance Abuse Neg Hx     Mental illness Neg Hx       Social History:     E-Cigarette/Vaping    E-Cigarette Use Never User      E-Cigarette/Vaping Substances    Nicotine No     Flavoring No      Social History     Socioeconomic History    Marital status: /Civil Union     Spouse name: None    Number of children: None    Years of education: None    Highest education level: None   Occupational History    None   Social Needs    Financial resource strain: None    Food insecurity:     Worry: None     Inability: None    Transportation needs:     Medical: None     Non-medical: None   Tobacco Use    Smoking status: Never Smoker    Smokeless tobacco: Never Used   Substance and Sexual Activity    Alcohol use: Yes     Frequency: Never     Drinks per session: 1 or 2     Binge frequency: Never     Comment: Socially    Drug use: Never    Sexual activity: Yes     Partners: Female   Lifestyle    Physical activity:     Days per week: None     Minutes per session: None    Stress: None   Relationships    Social connections:     Talks on phone: None     Gets together: None     Attends Ca[employer] service: None     Active member of club or organization: None     Attends meetings of clubs or organizations: None     Relationship status: None    Intimate partner violence:     Fear of current or ex partner: None     Emotionally abused: None     Physically abused: None     Forced sexual activity: None   Other Topics Concern    None   Social History Narrative    None      Current Medications:     Current Outpatient Medications   Medication Sig Dispense Refill    ibuprofen (MOTRIN) 600 mg tablet Take 1 tablet by mouth daily      methocarbamol (ROBAXIN) 500 mg tablet Take 1 tablet (500 mg total) by mouth 3 (three) times a day 20 tablet 0    Multiple Vitamin (ONE-A-DAY MENS PO) Take by mouth       No current facility-administered medications for this visit  Allergies:     No Known Allergies   Physical Exam:     /60 (BP Location: Left arm, Patient Position: Sitting, Cuff Size: Standard)   Pulse 68   Resp 16   Ht 5' 10 5" (1 791 m)   Wt 74 6 kg (164 lb 8 oz)   BMI 23 27 kg/m²     Physical Exam   Constitutional: He is oriented to person, place, and time  He appears well-developed and well-nourished  HENT:   Head: Normocephalic and atraumatic  Right Ear: Hearing, tympanic membrane, external ear and ear canal normal    Left Ear: Hearing, tympanic membrane, external ear and ear canal normal    Nose: Nose normal    Mouth/Throat: Oropharynx is clear and moist    Eyes: Pupils are equal, round, and reactive to light  Conjunctivae and EOM are normal    Neck: Normal range of motion  Neck supple  No thyromegaly present  Cardiovascular: Normal rate, regular rhythm, normal heart sounds and intact distal pulses  No murmur heard    Pulmonary/Chest: Effort normal and breath sounds normal  No respiratory distress  He has no wheezes  He has no rales  Abdominal: Soft  Bowel sounds are normal  He exhibits no distension and no mass  There is no tenderness  Musculoskeletal: Normal range of motion  He exhibits no edema  Lymphadenopathy:     He has no cervical adenopathy  Neurological: He is alert and oriented to person, place, and time  He has normal reflexes  No cranial nerve deficit  Skin: Skin is warm and dry  Psychiatric: He has a normal mood and affect   Judgment normal        Gerrijose Rodriguez PA-C  [address]

## 2020-03-17 NOTE — PATIENT INSTRUCTIONS

## 2020-03-17 NOTE — PROGRESS NOTES
PT Evaluation     Today's date: 3/17/2020  Patient name: Sinai Fulton  : 1970  MRN: [id number]  Referring provider: Artur Rizzo DO  Dx:   Encounter Diagnosis     ICD-10-CM    1  Strain of quadriceps tendon, left, initial encounter [id number] Ambulatory referral to Physical Therapy                  Assessment  Assessment details: Pt is a 48 y o  male presenting to PT with L quadriceps strain following injury from soccer game  PT findings include: + TTP of quadriceps, poor SLR, global decreased L LE strength with pain recreated during quad activation and limited knee flexion ROM all consistent with s/p strain of L quad  Pt will benefit from skilled PT to address above impairments to return to PLOF with less restriction and to reach functional goals  Impairments: abnormal muscle firing, abnormal muscle tone, abnormal or restricted ROM, abnormal movement, impaired physical strength, lacks appropriate home exercise program and pain with function  Functional limitations: difficulty with squatting, tall kneeling, running, stair climbingUnderstanding of Dx/Px/POC: good   Prognosis: good    Goals  1  Pt will demonstrate understanding of HEP and POC in order to improve compliance with course of rehab in 2 weeks  2  Pt will demonstrate symmetrical L knee flexion ROM in order to get into tall kneeling position for work in 4 weeks  3  Pt will demonstrate 5/5 hip flexion MMT in order to be able to climb ladders with no restriction at work by d/c  4  Pt will demonstrate 5/5 L hip abduction/extension strength in order to improve L neuromuscular control and return to coaching soccer/jog by d/c  5   Pt will demonstrate 0/10 pain at worst in order to return to working out recreationally and run by d/c      Plan  Patient would benefit from: skilled physical therapy  Planned modality interventions: low level laser therapy  Planned therapy interventions: manual therapy, neuromuscular re-education, patient education, strengthening, stretching, therapeutic activities, therapeutic exercise, transfer training, home exercise program, functional ROM exercises and flexibility  Frequency: 2x week  Duration in weeks: 8  Treatment plan discussed with: patient        Subjective Evaluation    History of Present Illness  Mechanism of injury: Pt reports that on  he had a soccer game  Pt specifically reports it being a cold day  He thought that the game would be indoors however, noted that it was outdoors which he did not dress for  He had little time to warm up or stretch  In the middle of the game while sprinting, he had episode of sharp L quad pain and subsequent buckling of L leg  Pt fell forward, noted immediate bruising and swelling into L quad  Pt states that bruising took 2 weeks to go down  He continues to note a lump and "scar tissue" that is palpable  He states that physician reports no rupture  Quality of life: good    Pain  Current pain ratin  At best pain ratin  At worst pain ratin  Location: L quad  Quality: dull ache and pulling  Relieving factors: ice, relaxation and medications  Aggravating factors: stair climbing, lifting and running (squatting, lunging)  Progression: improved      Diagnostic Tests  MRI studies: normal  Treatments  Previous treatment: medication  Patient Goals  Patient goals for therapy: decreased pain, return to work, return to sport/leisure activities, increased motion and increased strength  Patient goal: Pt would like to squat, get on knees, get into tall kneeling position for work          Objective    Knee ROM (R/L):  Knee Flex: 153°/133°  Knee Ext: WNL/WNL    Patellar Mobility:  Normal sup/inf/med/lat B    Knee Effusion Stroke Test:  R 0  L 0    Strength (R;L)  Knee Flex: 5/5; 3+/5 (pain)  Knee Ext: 5/5; 4+/5  Hip ABD: 5/5; 4+/5  Hip Ext: 5/5; 4/5  Hip Flex: 5/5; 3+/5 (pain)  Hip IR: 5/5; 5/5  Hip ER: 5/5; 5/5     Palpation:  TTP + distal rectus femoris, vastus intermedius with palpable area of tension  Special tests:  Dolly Henry: + B  Ramos: + B   Prone knee Flex (R/L): 130°/118° (pain recreation)    Quad Set: GOOD  SLR: unable (painful)           Precautions: None      Manual  3/17            STM rectus fem, vastus lateralis             L PROM knee flex                                                        Exercise Diary  3/17            Seated march demo            Supine bridge demo            Prone hip ext demo            Supine heel slide demo            LAQ demo            Clamshell             Tband HS curl             Standing hip abduction             S/L hip abduction             Elliptical Warm up             STS             Standing SLR                                                                                                                         Modalities              prn

## 2020-03-19 ENCOUNTER — APPOINTMENT (OUTPATIENT)
Dept: PHYSICAL THERAPY | Facility: CLINIC | Age: 50
End: 2020-03-19
Payer: COMMERCIAL

## 2020-03-19 ENCOUNTER — HOSPITAL ENCOUNTER (OUTPATIENT)
Dept: CT IMAGING | Facility: HOSPITAL | Age: 50
Discharge: HOME | End: 2020-03-19
Attending: UROLOGY
Payer: COMMERCIAL

## 2020-03-19 DIAGNOSIS — R10.9 FLANK PAIN: ICD-10-CM

## 2020-03-19 DIAGNOSIS — R31.29 MICROSCOPIC HEMATURIA: ICD-10-CM

## 2020-03-19 PROCEDURE — 74178 CT ABD&PLV WO CNTR FLWD CNTR: CPT

## 2020-03-19 RX ADMIN — IOHEXOL 100 ML: 350 INJECTION, SOLUTION INTRAVENOUS at 11:45

## 2020-03-26 ENCOUNTER — APPOINTMENT (OUTPATIENT)
Dept: PHYSICAL THERAPY | Facility: CLINIC | Age: 50
End: 2020-03-26
Payer: COMMERCIAL

## 2020-03-31 ENCOUNTER — APPOINTMENT (OUTPATIENT)
Dept: PHYSICAL THERAPY | Facility: CLINIC | Age: 50
End: 2020-03-31
Payer: COMMERCIAL

## 2020-04-13 ENCOUNTER — TELEPHONE (OUTPATIENT)
Dept: OBGYN CLINIC | Facility: CLINIC | Age: 50
End: 2020-04-13

## 2020-04-13 ENCOUNTER — OFFICE VISIT (OUTPATIENT)
Dept: PHYSICAL THERAPY | Facility: CLINIC | Age: 50
End: 2020-04-13

## 2020-04-13 DIAGNOSIS — S76.112A STRAIN OF QUADRICEPS TENDON, LEFT, INITIAL ENCOUNTER: Primary | ICD-10-CM

## 2020-04-20 ENCOUNTER — HOSPITAL ENCOUNTER (OUTPATIENT)
Dept: RADIOLOGY | Facility: HOSPITAL | Age: 50
Discharge: HOME | End: 2020-04-20
Attending: FAMILY MEDICINE
Payer: COMMERCIAL

## 2020-04-20 ENCOUNTER — OFFICE VISIT (OUTPATIENT)
Dept: OBGYN CLINIC | Facility: CLINIC | Age: 50
End: 2020-04-20
Payer: COMMERCIAL

## 2020-04-20 VITALS
BODY MASS INDEX: 23.1 KG/M2 | HEIGHT: 71 IN | DIASTOLIC BLOOD PRESSURE: 80 MMHG | SYSTOLIC BLOOD PRESSURE: 138 MMHG | WEIGHT: 165 LBS | TEMPERATURE: 96.7 F

## 2020-04-20 DIAGNOSIS — S76.112A STRAIN OF QUADRICEPS TENDON, LEFT, INITIAL ENCOUNTER: ICD-10-CM

## 2020-04-20 DIAGNOSIS — S76.112A STRAIN OF QUADRICEPS TENDON, LEFT, INITIAL ENCOUNTER: Primary | ICD-10-CM

## 2020-04-20 PROCEDURE — 99213 OFFICE O/P EST LOW 20 MIN: CPT | Performed by: FAMILY MEDICINE

## 2020-04-20 PROCEDURE — 73718 MRI LOWER EXTREMITY W/O DYE: CPT

## 2020-04-20 PROCEDURE — 3008F BODY MASS INDEX DOCD: CPT | Performed by: FAMILY MEDICINE

## 2020-04-20 PROCEDURE — 1036F TOBACCO NON-USER: CPT | Performed by: FAMILY MEDICINE

## 2020-04-21 ENCOUNTER — TELEPHONE (OUTPATIENT)
Dept: OBGYN CLINIC | Facility: HOSPITAL | Age: 50
End: 2020-04-21

## 2020-04-24 ENCOUNTER — OFFICE VISIT (OUTPATIENT)
Dept: OBGYN CLINIC | Facility: MEDICAL CENTER | Age: 50
End: 2020-04-24
Payer: COMMERCIAL

## 2020-04-24 VITALS
HEART RATE: 59 BPM | TEMPERATURE: 98.2 F | DIASTOLIC BLOOD PRESSURE: 74 MMHG | WEIGHT: 165 LBS | SYSTOLIC BLOOD PRESSURE: 118 MMHG | HEIGHT: 71 IN | BODY MASS INDEX: 23.1 KG/M2

## 2020-04-24 DIAGNOSIS — S76.112A STRAIN OF QUADRICEPS TENDON, LEFT, INITIAL ENCOUNTER: Primary | ICD-10-CM

## 2020-04-24 DIAGNOSIS — T14.8XXA HEMATOMA: ICD-10-CM

## 2020-04-24 PROCEDURE — 99243 OFF/OP CNSLTJ NEW/EST LOW 30: CPT | Performed by: ORTHOPAEDIC SURGERY

## 2020-04-28 ENCOUNTER — OFFICE VISIT (OUTPATIENT)
Dept: FAMILY MEDICINE CLINIC | Facility: CLINIC | Age: 50
End: 2020-04-28
Payer: COMMERCIAL

## 2020-04-28 VITALS
HEIGHT: 70 IN | SYSTOLIC BLOOD PRESSURE: 120 MMHG | HEART RATE: 76 BPM | RESPIRATION RATE: 16 BRPM | BODY MASS INDEX: 23.84 KG/M2 | DIASTOLIC BLOOD PRESSURE: 60 MMHG | WEIGHT: 166.5 LBS

## 2020-04-28 DIAGNOSIS — S76.912D: Primary | ICD-10-CM

## 2020-04-28 PROCEDURE — 3008F BODY MASS INDEX DOCD: CPT | Performed by: PHYSICIAN ASSISTANT

## 2020-04-28 PROCEDURE — 99213 OFFICE O/P EST LOW 20 MIN: CPT | Performed by: PHYSICIAN ASSISTANT

## 2020-04-28 PROCEDURE — 1036F TOBACCO NON-USER: CPT | Performed by: PHYSICIAN ASSISTANT

## 2020-05-07 ENCOUNTER — OFFICE VISIT (OUTPATIENT)
Dept: PHYSICAL THERAPY | Facility: CLINIC | Age: 50
End: 2020-05-07
Payer: COMMERCIAL

## 2020-05-07 DIAGNOSIS — S76.112A STRAIN OF QUADRICEPS TENDON, LEFT, INITIAL ENCOUNTER: Primary | ICD-10-CM

## 2020-05-07 PROCEDURE — 97110 THERAPEUTIC EXERCISES: CPT

## 2020-05-07 PROCEDURE — 97112 NEUROMUSCULAR REEDUCATION: CPT

## 2020-05-21 ENCOUNTER — TELEPHONE (OUTPATIENT)
Dept: OBGYN CLINIC | Facility: MEDICAL CENTER | Age: 50
End: 2020-05-21

## 2021-02-09 DIAGNOSIS — Z12.11 SCREEN FOR COLON CANCER: Primary | ICD-10-CM

## 2021-02-09 RX ORDER — SODIUM, POTASSIUM,MAG SULFATES 17.5-3.13G
SOLUTION, RECONSTITUTED, ORAL ORAL
Qty: 2 BOTTLE | Refills: 0 | Status: SHIPPED | OUTPATIENT
Start: 2021-02-09 | End: 2021-02-17 | Stop reason: ALTCHOICE

## 2021-02-09 NOTE — TELEPHONE ENCOUNTER
OA colon scheduled for 02/17/21 with Dr Ascencion Will instructions given verbally/emailed to patient

## 2021-02-11 ENCOUNTER — ANESTHESIA EVENT (OUTPATIENT)
Dept: GASTROENTEROLOGY | Facility: AMBULARY SURGERY CENTER | Age: 51
End: 2021-02-11

## 2021-02-17 ENCOUNTER — HOSPITAL ENCOUNTER (OUTPATIENT)
Dept: GASTROENTEROLOGY | Facility: AMBULARY SURGERY CENTER | Age: 51
Setting detail: OUTPATIENT SURGERY
Discharge: HOME | End: 2021-02-17
Attending: INTERNAL MEDICINE
Payer: COMMERCIAL

## 2021-02-17 ENCOUNTER — ANESTHESIA (OUTPATIENT)
Dept: GASTROENTEROLOGY | Facility: AMBULARY SURGERY CENTER | Age: 51
End: 2021-02-17
Payer: COMMERCIAL

## 2021-02-17 VITALS
HEART RATE: 62 BPM | SYSTOLIC BLOOD PRESSURE: 98 MMHG | BODY MASS INDEX: 23.64 KG/M2 | WEIGHT: 156 LBS | OXYGEN SATURATION: 100 % | HEIGHT: 68 IN | TEMPERATURE: 97.8 F | DIASTOLIC BLOOD PRESSURE: 55 MMHG | RESPIRATION RATE: 17 BRPM

## 2021-02-17 VITALS — HEART RATE: 62 BPM

## 2021-02-17 DIAGNOSIS — Z12.11 SCREEN FOR COLON CANCER: ICD-10-CM

## 2021-02-17 PROBLEM — N28.1 RENAL CYST: Status: ACTIVE | Noted: 2021-02-17

## 2021-02-17 PROBLEM — C62.90 TESTICULAR CANCER (HCC): Status: ACTIVE | Noted: 2021-02-17

## 2021-02-17 PROCEDURE — G0121 COLON CA SCRN NOT HI RSK IND: HCPCS | Performed by: INTERNAL MEDICINE

## 2021-02-17 RX ORDER — PROPOFOL 10 MG/ML
INJECTION, EMULSION INTRAVENOUS AS NEEDED
Status: DISCONTINUED | OUTPATIENT
Start: 2021-02-17 | End: 2021-02-17

## 2021-02-17 RX ORDER — LIDOCAINE HYDROCHLORIDE 10 MG/ML
INJECTION, SOLUTION EPIDURAL; INFILTRATION; INTRACAUDAL; PERINEURAL AS NEEDED
Status: DISCONTINUED | OUTPATIENT
Start: 2021-02-17 | End: 2021-02-17

## 2021-02-17 RX ORDER — SODIUM CHLORIDE, SODIUM LACTATE, POTASSIUM CHLORIDE, CALCIUM CHLORIDE 600; 310; 30; 20 MG/100ML; MG/100ML; MG/100ML; MG/100ML
125 INJECTION, SOLUTION INTRAVENOUS CONTINUOUS
Status: DISCONTINUED | OUTPATIENT
Start: 2021-02-17 | End: 2021-02-21 | Stop reason: HOSPADM

## 2021-02-17 RX ORDER — PROPOFOL 10 MG/ML
INJECTION, EMULSION INTRAVENOUS CONTINUOUS PRN
Status: DISCONTINUED | OUTPATIENT
Start: 2021-02-17 | End: 2021-02-17

## 2021-02-17 RX ORDER — LIDOCAINE HYDROCHLORIDE 10 MG/ML
0.5 INJECTION, SOLUTION EPIDURAL; INFILTRATION; INTRACAUDAL; PERINEURAL ONCE AS NEEDED
Status: DISCONTINUED | OUTPATIENT
Start: 2021-02-17 | End: 2021-02-21 | Stop reason: HOSPADM

## 2021-02-17 RX ADMIN — LIDOCAINE HYDROCHLORIDE 50 MG: 10 INJECTION, SOLUTION EPIDURAL; INFILTRATION; INTRACAUDAL at 09:05

## 2021-02-17 RX ADMIN — PROPOFOL 120 MCG/KG/MIN: 10 INJECTION, EMULSION INTRAVENOUS at 09:05

## 2021-02-17 RX ADMIN — SODIUM CHLORIDE, SODIUM LACTATE, POTASSIUM CHLORIDE, AND CALCIUM CHLORIDE: .6; .31; .03; .02 INJECTION, SOLUTION INTRAVENOUS at 09:05

## 2021-02-17 RX ADMIN — PROPOFOL 20 MG: 10 INJECTION, EMULSION INTRAVENOUS at 09:14

## 2021-02-17 RX ADMIN — PROPOFOL 120 MG: 10 INJECTION, EMULSION INTRAVENOUS at 09:05

## 2021-02-17 RX ADMIN — PROPOFOL 30 MG: 10 INJECTION, EMULSION INTRAVENOUS at 09:06

## 2021-02-17 NOTE — ANESTHESIA PREPROCEDURE EVALUATION
Procedure:  COLONOSCOPY    Relevant Problems   ANESTHESIA (within normal limits)      CARDIO (within normal limits)      ENDO (within normal limits)      GI/HEPATIC (within normal limits)      /RENAL   (+) Renal cyst      HEMATOLOGY (within normal limits)      MUSCULOSKELETAL (within normal limits)      NEURO/PSYCH (within normal limits)      PULMONARY (within normal limits)      Other   (+) Testicular cancer [facility])        Physical Exam    Airway    Mallampati score: I  TM Distance: >3 FB  Neck ROM: full     Dental   No notable dental hx     Cardiovascular      Pulmonary      Other Findings        Anesthesia Plan  ASA Score- 2     Anesthesia Type- IV sedation with anesthesia with ASA Monitors  Additional Monitors:   Airway Plan:           Plan Factors-Exercise tolerance (METS): >4 METS  Chart reviewed  Existing labs reviewed  Patient summary reviewed  Patient is not a current smoker  Induction-     Postoperative Plan-     Informed Consent- Anesthetic plan and risks discussed with patient  I personally reviewed this patient with the CRNA  Discussed and agreed on the Anesthesia Plan with the CRNA  Pancho Barclay

## 2021-02-17 NOTE — ANESTHESIA POSTPROCEDURE EVALUATION
Post-Op Assessment Note    CV Status:  Stable  Pain Score: 0    Pain management: adequate     Mental Status:  Awake and lethargic   Hydration Status:  Euvolemic   PONV Controlled:  None   Airway Patency:  Patent      Post Op Vitals Reviewed: Yes      Staff: Anesthesiologist         No complications documented      BP (!) 82/43 (02/17/21 0919)    Temp 97 8 °F (36 6 °C) (02/17/21 0919)    Pulse 62 (02/17/21 0919)   Resp 17 (02/17/21 0919)    SpO2 98 % (02/17/21 0919)

## 2021-02-17 NOTE — DISCHARGE INSTRUCTIONS
Diverticulosis   WHAT YOU NEED TO KNOW:   Diverticulosis is a condition that causes small pockets called diverticula to form in your intestine  These pockets make it difficult for bowel movements to pass through your digestive system  DISCHARGE INSTRUCTIONS:   Seek care immediately if:   · You have severe pain on the left side of your lower abdomen  · Your bowel movements are bright or dark red  Contact your healthcare provider if:   · You have a fever and chills  · You feel dizzy or lightheaded  · You have nausea, or you are vomiting  · You have a change in your bowel movements  · You have questions or concerns about your condition or care  Medicines:   · Medicines  to soften your bowel movements may be given  You may also need medicines to treat symptoms such as bloating and pain  · Take your medicine as directed  Contact your healthcare provider if you think your medicine is not helping or if you have side effects  Tell him or her if you are allergic to any medicine  Keep a list of the medicines, vitamins, and herbs you take  Include the amounts, and when and why you take them  Bring the list or the pill bottles to follow-up visits  Carry your medicine list with you in case of an emergency  Self-care: The goal of treatment is to manage any symptoms you have and prevent other problems such as diverticulitis  Diverticulitis is swelling or infection of the diverticula  Your healthcare provider may recommend any of the following:  · Eat a variety of high-fiber foods  High-fiber foods help you have regular bowel movements  High-fiber foods include cooked beans, fruits, vegetables, and some cereals  Most adults need 25 to 35 grams of fiber each day  Your healthcare provider may recommend that you have more  Ask your healthcare provider how much fiber you need  Increase fiber slowly  You may have abdominal discomfort, bloating, and gas if you add fiber to your diet too quickly   You may need to take a fiber supplement if you are not getting enough fiber from food  · Drink liquids as directed  You may need to drink 2 to 3 liters (8 to 12 cups) of liquids every day  Ask your healthcare provider how much liquid to drink each day and which liquids are best for you  · Apply heat  on your abdomen for 20 to 30 minutes every 2 hours for as many days as directed  Heat helps decrease pain and muscle spasms  Help prevent diverticulitis or other symptoms: The following may help decrease your risk for diverticulitis or symptoms, such as bleeding  Talk to your provider about these or other things you can do to prevent problems that may occur with diverticulosis  · Exercise regularly  Ask your healthcare provider about the best exercise plan for you  Exercise can help you have regular bowel movements  Get 30 minutes of exercise on most days of the week  · Maintain a healthy weight  Ask your healthcare provider how much you should weigh  Ask him or her to help you create a weight loss plan if you are overweight  · Do not smoke  Nicotine and other chemicals in cigarettes increase your risk for diverticulitis  Ask your healthcare provider for information if you currently smoke and need help to quit  E-cigarettes or smokeless tobacco still contain nicotine  Talk to your healthcare provider before you use these products  · Ask your healthcare provider if it is safe to take NSAIDs  NSAIDs may increase your risk of diverticulitis  Follow up with your healthcare provider as directed:  Write down your questions so you remember to ask them during your visits  © Copyright [address] Information is for End User's use only and may not be sold, redistributed or otherwise used for commercial purposes  All illustrations and images included in CareNotes® are the copyrighted property of [employer]  or [facility] [employer]uro [employer]apps   The above information is an  only   It is not intended as medical advice for individual conditions or treatments  Talk to your doctor, nurse or pharmacist before following any medical regimen to see if it is safe and effective for you

## 2021-02-17 NOTE — H&P
History and Physical -  [facility]  Randy Jaime 48 y o  male MRN: [id number]                  HPI: Randy Jaime is a 48y o  year old male who presents for colonsocopy      REVIEW OF SYSTEMS: Per the HPI, and otherwise unremarkable      Historical Information   Past Medical History:   Diagnosis Date    Renal cyst     Testicular cancer ([address] )     Left    Vertigo      Past Surgical History:   Procedure Laterality Date    DENTAL SURGERY      [place] teeth extraction    TESTICLE SURGERY      Left     Social History   Social History     Substance and Sexual Activity   Alcohol Use Yes    Frequency: Never    Drinks per session: 1 or 2    Binge frequency: Never     Social History     Substance and Sexual Activity   Drug Use Never     Social History     Tobacco Use   Smoking Status Never Smoker   Smokeless Tobacco Never Used     Family History   Problem Relation Age of Onset    Endometriosis Mother     Cervical cancer Mother     Osteoporosis Mother     Testicular cancer Father     Lymphoma Father     Alcohol abuse Sister     Osteoporosis Sister     Diabetes Maternal Grandmother     Liver cancer Maternal Uncle     Cancer Maternal Uncle 68        colon    Liver cancer Paternal Uncle     Cancer Paternal Uncle 76        colon    Substance Abuse Neg Hx     Mental illness Neg Hx        Meds/Allergies       Current Outpatient Medications:     Multiple Vitamin (ONE-A-DAY MENS PO)    Cholecalciferol (VITAMIN D-3 PO)    Cyanocobalamin (VITAMIN B-12 PO)    Current Facility-Administered Medications:     lactated ringers infusion, 125 mL/hr, Intravenous, Continuous    lidocaine (PF) (XYLOCAINE-MPF) 1 % injection 0 5 mL, 0 5 mL, Infiltration, Once PRN    No Known Allergies    Objective     /69   Pulse 70   Temp (!) 96 9 °F (36 1 °C) (Temporal)   Resp 18   Ht 5' 8" (1 727 m)   Wt 70 8 kg (156 lb)   SpO2 99%   BMI 23 72 kg/m²       PHYSICAL EXAM    Gen: NAD  CV: RRR  CHEST: Clear  ABD: soft, NT/ND  EXT: no edema      ASSESSMENT/PLAN:  This is a 48y o  year old male here for colonoscopy, and he is stable and optimized for his procedure

## 2021-02-23 ENCOUNTER — OFFICE VISIT (OUTPATIENT)
Dept: FAMILY MEDICINE CLINIC | Facility: CLINIC | Age: 51
End: 2021-02-23
Payer: COMMERCIAL

## 2021-02-23 VITALS
BODY MASS INDEX: 23.72 KG/M2 | WEIGHT: 169.4 LBS | SYSTOLIC BLOOD PRESSURE: 120 MMHG | HEIGHT: 71 IN | HEART RATE: 70 BPM | RESPIRATION RATE: 16 BRPM | DIASTOLIC BLOOD PRESSURE: 80 MMHG

## 2021-02-23 DIAGNOSIS — Z13.29 SCREENING FOR THYROID DISORDER: ICD-10-CM

## 2021-02-23 DIAGNOSIS — Z12.5 SCREENING FOR PROSTATE CANCER: ICD-10-CM

## 2021-02-23 DIAGNOSIS — Z13.1 SCREENING FOR DIABETES MELLITUS: ICD-10-CM

## 2021-02-23 DIAGNOSIS — Z13.220 SCREENING FOR CHOLESTEROL LEVEL: ICD-10-CM

## 2021-02-23 DIAGNOSIS — Z00.00 ANNUAL PHYSICAL EXAM: Primary | ICD-10-CM

## 2021-02-23 DIAGNOSIS — Z13.0 SCREENING FOR BLOOD DISEASE: ICD-10-CM

## 2021-02-23 DIAGNOSIS — Z13.228 SCREENING FOR METABOLIC DISORDER: ICD-10-CM

## 2021-02-23 PROCEDURE — 1036F TOBACCO NON-USER: CPT | Performed by: FAMILY MEDICINE

## 2021-02-23 PROCEDURE — 3725F SCREEN DEPRESSION PERFORMED: CPT | Performed by: FAMILY MEDICINE

## 2021-02-23 PROCEDURE — 99396 PREV VISIT EST AGE 40-64: CPT | Performed by: FAMILY MEDICINE

## 2021-02-23 PROCEDURE — 3008F BODY MASS INDEX DOCD: CPT | Performed by: FAMILY MEDICINE

## 2021-02-23 RX ORDER — METHOCARBAMOL 500 MG/1
TABLET, FILM COATED ORAL
COMMUNITY
End: 2021-02-23 | Stop reason: ALTCHOICE

## 2021-02-23 NOTE — PATIENT INSTRUCTIONS

## 2021-02-23 NOTE — PROGRESS NOTES
ADULT ANNUAL PHYSICAL  [address]  FAMILY PRACTICE    NAME: Randy Jaime  AGE: 48 y o  SEX: male  : 1970     DATE: 2021     Assessment and Plan:     1  Annual physical exam  - will obtain screening labs and call with the results  - CBC and differential; Future  - Lipid Panel with Direct LDL reflex; Future  - Comprehensive metabolic panel; Future  - PSA, Total Screen; Future  - TSH, 3rd generation with Free T4 reflex; Future    2  Left flank pain  - like musculoskeletal, advised rest and avoid lifting weights overhead, discussed CT abdomen/ pelvis if symptoms not better    Immunizations and preventive care screenings were discussed with patient today  Appropriate education was printed on patient's after visit summary  Counseling:  Alcohol/drug use: discussed moderation in alcohol intake, the recommendations for healthy alcohol use, and avoidance of illicit drug use  Dental Health: discussed importance of regular tooth brushing, flossing, and dental visits  Injury prevention: discussed safety/seat belts, safety helmets, smoke detectors, carbon dioxide detectors, and smoking near bedding or upholstery  · Exercise: the importance of regular exercise/physical activity was discussed  Recommend exercise 3-5 times per week for at least 30 minutes  Return for annual physical in 1 year or sooner as needed  Chief Complaint:     Chief Complaint   Patient presents with    Physical Exam      History of Present Illness:     Adult Annual Physical   Patient here for a comprehensive physical exam  The patient reports recurrence of left upper abdomen/ flank pain for about a month after he went back to the gym and started lifting weights overhead  His pain exacerbated by movement, like bending, twisting or lifting, not related to meals, no n/v/d/c, no melena/ hematochezia, no urinary frequency/ urgency or hematuria   He had colonoscopy on 02/17/21 which was unremarkable  Patient had similar symptoms about a year ago after lifting weights, he was seen in ER on 03/08/20 and his CT abdomen was unremarkable  His pain gradually went away after he stopped lifting weights  Diet and Physical Activity  · Diet/Nutrition: well balanced diet  · Exercise: 3-4 times a week on average  Depression Screening  PHQ-9 Depression Screening    PHQ-9:   Frequency of the following problems over the past two weeks:      Little interest or pleasure in doing things: 0 - not at all  Feeling down, depressed, or hopeless: 0 - not at all  PHQ-2 Score: 0       General Health  · Sleep: sleeps well  · Hearing: normal - bilateral   · Vision: most recent eye exam >1 year ago  · Dental: regular dental visits   Health  · Symptoms include: none     Review of Systems:     Review of Systems   Constitutional: Negative for appetite change, chills, fatigue, fever and unexpected weight change  HENT: Negative for congestion, ear discharge, ear pain, nosebleeds, rhinorrhea, sore throat and trouble swallowing  Eyes: Negative for pain, discharge, redness, itching and visual disturbance  Respiratory: Negative for cough, shortness of breath and wheezing  Cardiovascular: Negative for chest pain, palpitations and leg swelling  Gastrointestinal: Negative for abdominal pain, blood in stool, constipation, diarrhea, nausea and vomiting  Endocrine: Negative for cold intolerance, heat intolerance, polydipsia, polyphagia and polyuria  Genitourinary: Negative for dysuria, frequency, hematuria, penile pain, testicular pain and urgency  Musculoskeletal: Negative for gait problem, joint swelling and myalgias  Left flank pain   Skin: Negative for rash  Neurological: Negative for dizziness, syncope, weakness, numbness and headaches  Hematological: Negative for adenopathy  Psychiatric/Behavioral: Negative for dysphoric mood and sleep disturbance   The patient is not nervous/anxious         Past Medical History:     Past Medical History:   Diagnosis Date    Renal cyst     Testicular cancer ([address] )     Left    Vertigo       Past Surgical History:     Past Surgical History:   Procedure Laterality Date    DENTAL SURGERY      [place] teeth extraction    TESTICLE SURGERY      Left      Family History:     Family History   Problem Relation Age of Onset    Endometriosis Mother     Cervical cancer Mother     Osteoporosis Mother     Testicular cancer Father     Lymphoma Father     Alcohol abuse Sister     Osteoporosis Sister     Diabetes Maternal Grandmother     Liver cancer Maternal Uncle     Cancer Maternal Uncle 68        colon    Colon cancer Maternal Uncle     Liver cancer Paternal Uncle     Cancer Paternal Uncle 76        colon    Colon cancer Paternal Uncle     Colon cancer Maternal Grandfather     Substance Abuse Neg Hx     Mental illness Neg Hx       Social History:     E-Cigarette/Vaping    E-Cigarette Use Never User      E-Cigarette/Vaping Substances    Nicotine No     Flavoring No      Social History     Socioeconomic History    Marital status: /Civil Union     Spouse name: None    Number of children: None    Years of education: None    Highest education level: None   Occupational History    None   Social Needs    Financial resource strain: None    Food insecurity     Worry: None     Inability: None    Transportation needs     Medical: None     Non-medical: None   Tobacco Use    Smoking status: Never Smoker    Smokeless tobacco: Never Used   Substance and Sexual Activity    Alcohol use: Yes     Frequency: Never     Drinks per session: 1 or 2     Binge frequency: Never    Drug use: Never    Sexual activity: Yes     Partners: Female   Lifestyle    Physical activity     Days per week: None     Minutes per session: None    Stress: None   Relationships    Social connections     Talks on phone: None     Gets together: None     Attends Restorationist service: None     Active member of club or organization: None     Attends meetings of clubs or organizations: None     Relationship status: None    Intimate partner violence     Fear of current or ex partner: None     Emotionally abused: None     Physically abused: None     Forced sexual activity: None   Other Topics Concern    None   Social History Narrative    Uses seatbelts    Caffeine use    Has smoke detectors      Current Medications:     Current Outpatient Medications   Medication Sig Dispense Refill    Cyanocobalamin (VITAMIN B-12 PO) Take by mouth      Multiple Vitamin (ONE-A-DAY MENS PO) Take by mouth       No current facility-administered medications for this visit  Allergies:     No Known Allergies   Physical Exam:     /80   Pulse 70   Resp 16   Ht 5' 11" (1 803 m)   Wt 76 8 kg (169 lb 6 4 oz)   BMI 23 63 kg/m²     Physical Exam  Constitutional:       General: He is not in acute distress  Appearance: He is well-developed  HENT:      Head: Normocephalic and atraumatic  Right Ear: Tympanic membrane, ear canal and external ear normal       Left Ear: Tympanic membrane, ear canal and external ear normal       Mouth/Throat:      Mouth: Mucous membranes are moist       Pharynx: Oropharynx is clear  Eyes:      General: No scleral icterus  Extraocular Movements: Extraocular movements intact  Conjunctiva/sclera: Conjunctivae normal       Pupils: Pupils are equal, round, and reactive to light  Neck:      Musculoskeletal: Neck supple  Thyroid: No thyromegaly  Vascular: No JVD  Cardiovascular:      Rate and Rhythm: Normal rate and regular rhythm  Heart sounds: Normal heart sounds  No murmur  Pulmonary:      Effort: Pulmonary effort is normal  No respiratory distress  Breath sounds: Normal breath sounds  No wheezing, rhonchi or rales  Abdominal:      General: Bowel sounds are normal  There is no distension        Palpations: Abdomen is soft  There is no mass  Tenderness: There is no abdominal tenderness  There is no right CVA tenderness or left CVA tenderness  Musculoskeletal:      Right lower leg: No edema  Left lower leg: No edema  Comments: No tenderness along left lower ribs   Lymphadenopathy:      Cervical: No cervical adenopathy  Skin:     Findings: No rash  Neurological:      General: No focal deficit present  Mental Status: He is alert and oriented to person, place, and time  Cranial Nerves: No cranial nerve deficit  Psychiatric:         Mood and Affect: Mood normal          Behavior: Behavior normal          Thought Content:  Thought content normal          Judgment: Judgment normal           Katie Fabian MD  [facility]

## 2021-04-19 ENCOUNTER — APPOINTMENT (OUTPATIENT)
Dept: LAB | Facility: CLINIC | Age: 51
End: 2021-04-19
Payer: COMMERCIAL

## 2021-04-19 DIAGNOSIS — Z12.5 SCREENING FOR PROSTATE CANCER: ICD-10-CM

## 2021-04-19 DIAGNOSIS — Z13.29 SCREENING FOR THYROID DISORDER: ICD-10-CM

## 2021-04-19 DIAGNOSIS — Z13.0 SCREENING FOR BLOOD DISEASE: ICD-10-CM

## 2021-04-19 DIAGNOSIS — Z13.220 SCREENING FOR CHOLESTEROL LEVEL: ICD-10-CM

## 2021-04-19 DIAGNOSIS — Z13.1 SCREENING FOR DIABETES MELLITUS: ICD-10-CM

## 2021-04-19 DIAGNOSIS — Z13.228 SCREENING FOR METABOLIC DISORDER: ICD-10-CM

## 2021-04-19 LAB
ALBUMIN SERPL BCP-MCNC: 4.2 G/DL (ref 3.5–5)
ALP SERPL-CCNC: 48 U/L (ref 46–116)
ALT SERPL W P-5'-P-CCNC: 21 U/L (ref 12–78)
ANION GAP SERPL CALCULATED.3IONS-SCNC: 4 MMOL/L (ref 4–13)
AST SERPL W P-5'-P-CCNC: 18 U/L (ref 5–45)
BASOPHILS # BLD AUTO: 0.02 THOUSANDS/ΜL (ref 0–0.1)
BASOPHILS NFR BLD AUTO: 0 % (ref 0–1)
BILIRUB SERPL-MCNC: 2.94 MG/DL (ref 0.2–1)
BUN SERPL-MCNC: 13 MG/DL (ref 5–25)
CALCIUM SERPL-MCNC: 9.6 MG/DL (ref 8.3–10.1)
CHLORIDE SERPL-SCNC: 105 MMOL/L (ref 100–108)
CHOLEST SERPL-MCNC: 168 MG/DL (ref 50–200)
CO2 SERPL-SCNC: 29 MMOL/L (ref 21–32)
CREAT SERPL-MCNC: 1.06 MG/DL (ref 0.6–1.3)
EOSINOPHIL # BLD AUTO: 0.05 THOUSAND/ΜL (ref 0–0.61)
EOSINOPHIL NFR BLD AUTO: 1 % (ref 0–6)
ERYTHROCYTE [DISTWIDTH] IN BLOOD BY AUTOMATED COUNT: 12.2 % (ref 11.6–15.1)
GFR SERPL CREATININE-BSD FRML MDRD: 81 ML/MIN/1.73SQ M
GLUCOSE P FAST SERPL-MCNC: 95 MG/DL (ref 65–99)
HCT VFR BLD AUTO: 47.6 % (ref 36.5–49.3)
HDLC SERPL-MCNC: 93 MG/DL
HGB BLD-MCNC: 16.5 G/DL (ref 12–17)
IMM GRANULOCYTES # BLD AUTO: 0.01 THOUSAND/UL (ref 0–0.2)
IMM GRANULOCYTES NFR BLD AUTO: 0 % (ref 0–2)
LDLC SERPL CALC-MCNC: 65 MG/DL (ref 0–100)
LYMPHOCYTES # BLD AUTO: 1.32 THOUSANDS/ΜL (ref 0.6–4.47)
LYMPHOCYTES NFR BLD AUTO: 23 % (ref 14–44)
MCH RBC QN AUTO: 33.4 PG (ref 26.8–34.3)
MCHC RBC AUTO-ENTMCNC: 34.7 G/DL (ref 31.4–37.4)
MCV RBC AUTO: 96 FL (ref 82–98)
MONOCYTES # BLD AUTO: 0.59 THOUSAND/ΜL (ref 0.17–1.22)
MONOCYTES NFR BLD AUTO: 10 % (ref 4–12)
NEUTROPHILS # BLD AUTO: 3.87 THOUSANDS/ΜL (ref 1.85–7.62)
NEUTS SEG NFR BLD AUTO: 66 % (ref 43–75)
NRBC BLD AUTO-RTO: 0 /100 WBCS
PLATELET # BLD AUTO: 217 THOUSANDS/UL (ref 149–390)
PMV BLD AUTO: 9.8 FL (ref 8.9–12.7)
POTASSIUM SERPL-SCNC: 4.6 MMOL/L (ref 3.5–5.3)
PROT SERPL-MCNC: 7.4 G/DL (ref 6.4–8.2)
PSA SERPL-MCNC: 0.5 NG/ML (ref 0–4)
RBC # BLD AUTO: 4.94 MILLION/UL (ref 3.88–5.62)
SODIUM SERPL-SCNC: 138 MMOL/L (ref 136–145)
TRIGL SERPL-MCNC: 48 MG/DL
TSH SERPL DL<=0.05 MIU/L-ACNC: 0.63 UIU/ML (ref 0.36–3.74)
WBC # BLD AUTO: 5.86 THOUSAND/UL (ref 4.31–10.16)

## 2021-04-19 PROCEDURE — 80053 COMPREHEN METABOLIC PANEL: CPT

## 2021-04-19 PROCEDURE — G0103 PSA SCREENING: HCPCS

## 2021-04-19 PROCEDURE — 84443 ASSAY THYROID STIM HORMONE: CPT

## 2021-04-19 PROCEDURE — 85025 COMPLETE CBC W/AUTO DIFF WBC: CPT

## 2021-04-19 PROCEDURE — 80061 LIPID PANEL: CPT

## 2021-04-19 PROCEDURE — 36415 COLL VENOUS BLD VENIPUNCTURE: CPT

## 2021-05-10 ENCOUNTER — TELEPHONE (OUTPATIENT)
Dept: FAMILY MEDICINE CLINIC | Facility: CLINIC | Age: 51
End: 2021-05-10

## 2021-05-11 DIAGNOSIS — R79.89 ABNORMAL LIVER FUNCTION TEST: Primary | ICD-10-CM

## 2021-05-11 DIAGNOSIS — R17 TOTAL BILIRUBIN, ELEVATED: ICD-10-CM

## 2021-06-21 ENCOUNTER — TELEPHONE (OUTPATIENT)
Dept: FAMILY MEDICINE CLINIC | Facility: CLINIC | Age: 51
End: 2021-06-21

## 2021-06-21 ENCOUNTER — CLINICAL SUPPORT (OUTPATIENT)
Dept: FAMILY MEDICINE CLINIC | Facility: CLINIC | Age: 51
End: 2021-06-21
Payer: COMMERCIAL

## 2021-06-21 DIAGNOSIS — Z23 NEED FOR TUBERCULOSIS VACCINATION: Primary | ICD-10-CM

## 2021-06-21 PROCEDURE — 86580 TB INTRADERMAL TEST: CPT

## 2021-06-21 NOTE — TELEPHONE ENCOUNTER
Charline De Jesus needs to get a PPD for employment with [employer]  OK to schedule?

## 2021-06-23 ENCOUNTER — CLINICAL SUPPORT (OUTPATIENT)
Dept: FAMILY MEDICINE CLINIC | Facility: CLINIC | Age: 51
End: 2021-06-23
Payer: COMMERCIAL

## 2021-06-23 DIAGNOSIS — Z11.1 ENCOUNTER FOR PPD SKIN TEST READING: ICD-10-CM

## 2021-06-23 LAB
INDURATION: 0 MM
TB SKIN TEST: NEGATIVE

## 2021-06-23 PROCEDURE — 86580 TB INTRADERMAL TEST: CPT

## 2021-08-09 ENCOUNTER — APPOINTMENT (OUTPATIENT)
Dept: RADIOLOGY | Facility: CLINIC | Age: 51
End: 2021-08-09
Payer: COMMERCIAL

## 2021-08-09 ENCOUNTER — OFFICE VISIT (OUTPATIENT)
Dept: URGENT CARE | Facility: CLINIC | Age: 51
End: 2021-08-09
Payer: COMMERCIAL

## 2021-08-09 VITALS
HEART RATE: 61 BPM | TEMPERATURE: 97.2 F | RESPIRATION RATE: 16 BRPM | DIASTOLIC BLOOD PRESSURE: 87 MMHG | BODY MASS INDEX: 24.36 KG/M2 | WEIGHT: 174 LBS | HEIGHT: 71 IN | SYSTOLIC BLOOD PRESSURE: 130 MMHG

## 2021-08-09 DIAGNOSIS — S59.901A INJURY OF RIGHT ELBOW, INITIAL ENCOUNTER: ICD-10-CM

## 2021-08-09 DIAGNOSIS — S52.124A CLOSED NONDISPLACED FRACTURE OF HEAD OF RIGHT RADIUS, INITIAL ENCOUNTER: Primary | ICD-10-CM

## 2021-08-09 PROCEDURE — 29105 APPLICATION LONG ARM SPLINT: CPT | Performed by: PHYSICIAN ASSISTANT

## 2021-08-09 PROCEDURE — 99213 OFFICE O/P EST LOW 20 MIN: CPT | Performed by: PHYSICIAN ASSISTANT

## 2021-08-09 PROCEDURE — 73080 X-RAY EXAM OF ELBOW: CPT

## 2021-08-09 RX ORDER — ACETAMINOPHEN AND CODEINE PHOSPHATE 300; 30 MG/1; MG/1
1 TABLET ORAL EVERY 4 HOURS PRN
Qty: 15 TABLET | Refills: 0 | Status: SHIPPED | OUTPATIENT
Start: 2021-08-09 | End: 2022-07-18 | Stop reason: ALTCHOICE

## 2021-08-09 NOTE — PATIENT INSTRUCTIONS
use Tylenol No  3 as needed for severe pain   use Motrin and/or Tylenol as needed for mild-to-moderate pain   Stay in sling and splint until seen by orthopedics   follow-up with Orthopedics  Follow up with PCP in 3-5 days  Proceed to  ER if symptoms worsen  Elbow Fracture   AMBULATORY CARE:   An elbow fracture  is a break in one or more of the 3 bones that form your elbow joint  Osteoporosis (brittle bones) can increase your risk for an elbow fracture  Common signs and symptoms include the following:   · Pain and tenderness    · Swelling and bruising    · Trouble moving your arm or not being able to move your arm at all    · Weakness or numbness in your elbow, arm, or hand    · Deformity (your arm is shaped differently than normal)    Seek care immediately if:   · Your skin becomes swollen, cold, or pale  · Your elbow, hand, or fingers are numb  Call your doctor if:   · You have a fever  · The pain gets worse, even after you rest and take your medicine  · You have new or more trouble moving your arm  · You have new sores around the area of your brace, splint, or cast     · Your brace, splint, or cast becomes damaged  · You have questions or concerns about your condition or care  Treatment for an elbow fracture  may include any of the following:  · Prescription pain medicine  may be given  Ask your healthcare provider how to take this medicine safely  Some prescription pain medicines contain acetaminophen  Do not take other medicines that contain acetaminophen without talking to your healthcare provider  Too much acetaminophen may cause liver damage  Prescription pain medicine may cause constipation  Ask your healthcare provider how to prevent or treat constipation  · NSAIDs , such as ibuprofen, help decrease swelling, pain, and fever  This medicine is available with or without a doctor's order  NSAIDs can cause stomach bleeding or kidney problems in certain people   If you take blood thinner medicine, always ask your healthcare provider if NSAIDs are safe for you  Always read the medicine label and follow directions  · A device  such as a brace, cast, sling, or splint may be put on your elbow to limit your arm movement  The device will hold the broken bones in place while they heal, help decrease pain, and prevent more damage  · Ultrasound therapy  directs sound waves into your elbow  The sound waves help the bones heal     · Surgery  may be needed to hold bones in their normal position with pins, wires, or screws  Surgery may also be done if you have other injuries, such as nerve or blood vessel damage  Self-care:   · Elevate your elbow  above the level of your heart as often as you can  This will help decrease swelling and pain  Prop your elbow on pillows or blankets to keep it elevated comfortably  While your elbow is elevated, wiggle your fingers and open and close them to prevent hand stiffness  · Apply ice  on your elbow on your elbow for 15 to 20 minutes every hour or as directed  Use an ice pack, or put crushed ice in a plastic bag  Cover it with a towel  Ice helps prevent tissue damage and decreases swelling and pain  · Go to physical therapy as directed  A physical therapist can teach you exercises to help improve movement and strength and to decrease pain  Care for your brace, cast, or splint:  Follow instructions about when you may take a bath or shower  It is important not to get your brace, cast, or splint wet  Cover your device with a plastic bag before you bathe  Tape the bag to your skin above the device to help keep out water  Hold your elbow away from the water in case the bag breaks  · Check the skin around your cast, brace, or splint daily for any redness or open skin  · Do not use a sharp or pointed object to scratch your skin under the cast, brace, or splint  · Do not remove your brace or splint unless directed      Follow up with your doctor as directed: You may need to see a specialist  Virgene Back may need more x-rays and treatment  Write down your questions so you remember to ask them during your visits  © Copyright [employer] 2021 Information is for End User's use only and may not be sold, redistributed or otherwise used for commercial purposes  All illustrations and images included in CareNotes® are the copyrighted property of A D A M , Inc  or [facility] Arturo Capps   The above information is an  only  It is not intended as medical advice for individual conditions or treatments  Talk to your doctor, nurse or pharmacist before following any medical regimen to see if it is safe and effective for you

## 2021-08-09 NOTE — PROGRESS NOTES
[email or web address]        NAME: Yamileth Lopez is a 46 y o  male  : 1970    MRN: [id number]  DATE: 2021  TIME: 10:11 AM    Assessment and Plan   Closed nondisplaced fracture of head of right radius, initial encounter [S52 124A]  1  Closed nondisplaced fracture of head of right radius, initial encounter  XR elbow 3+ vw right    acetaminophen-codeine (TYLENOL #3) 300-30 mg per tablet    Ambulatory referral to Orthopedic Surgery    Splint application         Patient Instructions      use Tylenol No  3 as needed for severe pain   use Motrin and/or Tylenol as needed for mild-to-moderate pain   Stay in sling and splint until seen by orthopedics   follow-up with Orthopedics  Follow up with PCP in 3-5 days  Proceed to  ER if symptoms worsen  Chief Complaint     Chief Complaint   Patient presents with    Elbow Pain     Right elbow pain and abrasion on right knee s/p fall off hover board yesterday  History of Present Illness       49-year-old male presents with right elbow injury  Patient reports he was playing on a [employer] TV board and accidentally fell off of it landing on his right elbow  Patient reports he had pain right away  Denies any head injuries or neck injuries  No other injuries reported  No previous injuries to the elbow  Patient reports he then lot pain with bending and extending his elbow  Reports that he cannot fully extend his elbow out  Denies any numbness or tingling into his right hand    Arm Pain   The incident occurred 12 to 24 hours ago  The incident occurred in the street  The injury mechanism was a fall  Pain location: Right elbow  The quality of the pain is described as aching  The pain does not radiate  The pain is moderate  The pain has been constant since the incident  Pertinent negatives include no chest pain, muscle weakness, numbness or tingling  The symptoms are aggravated by movement, palpation and lifting   He has tried elevation, ice and rest for the symptoms  The treatment provided no relief  Review of Systems   Review of Systems   Constitutional: Negative  Respiratory: Negative  Cardiovascular: Negative  Negative for chest pain  Gastrointestinal: Negative  Musculoskeletal: Positive for arthralgias and joint swelling  Skin: Negative  Neurological: Negative  Negative for tingling and numbness  Current Medications       Current Outpatient Medications:     Cyanocobalamin (VITAMIN B-12 PO), Take by mouth, Disp: , Rfl:     Multiple Vitamin (ONE-A-DAY MENS PO), Take by mouth, Disp: , Rfl:     acetaminophen-codeine (TYLENOL #3) 300-30 mg per tablet, Take 1 tablet by mouth every 4 (four) hours as needed for moderate pain, Disp: 15 tablet, Rfl: 0    Current Allergies     Allergies as of 08/09/2021    (No Known Allergies)            The following portions of the patient's history were reviewed and updated as appropriate: allergies, current medications, past family history, past medical history, past social history, past surgical history and problem list      Past Medical History:   Diagnosis Date    Renal cyst     Testicular cancer ([facility] [address] )     Left    Vertigo        Past Surgical History:   Procedure Laterality Date    DENTAL SURGERY      [place] teeth extraction    TESTICLE SURGERY      Left       Family History   Problem Relation Age of Onset    Endometriosis Mother     Cervical cancer Mother     Osteoporosis Mother     Testicular cancer Father     Lymphoma Father     Alcohol abuse Sister     Osteoporosis Sister     Diabetes Maternal Grandmother     Liver cancer Maternal Uncle     Cancer Maternal Uncle 68        colon    Colon cancer Maternal Uncle     Liver cancer Paternal Uncle     Cancer Paternal Uncle 76        colon    Colon cancer Paternal Uncle     Colon cancer Maternal Grandfather     Substance Abuse Neg Hx     Mental illness Neg Hx          Medications have been verified          Objective   /87 (BP Location: Right arm, Patient Position: Sitting, Cuff Size: Standard)   Pulse 61   Temp (!) 97 2 °F (36 2 °C) (Tympanic)   Resp 16   Ht 5' 11" (1 803 m)   Wt 78 9 kg (174 lb)   BMI 24 27 kg/m²   No LMP for male patient  Physical Exam     Physical Exam  Vitals and nursing note reviewed  Constitutional:       General: He is not in acute distress  Appearance: He is well-developed  HENT:      Head: Normocephalic and atraumatic  Right Ear: External ear normal       Left Ear: External ear normal       Nose: Nose normal    Eyes:      General:         Right eye: No discharge  Left eye: No discharge  Conjunctiva/sclera: Conjunctivae normal    Cardiovascular:      Rate and Rhythm: Normal rate and regular rhythm  Pulmonary:      Effort: Pulmonary effort is normal  No respiratory distress  Musculoskeletal:      Right elbow: Swelling present  No deformity, effusion or lacerations  Decreased range of motion  Tenderness present in radial head  Cervical back: Normal range of motion and neck supple  Skin:     General: Skin is warm and dry  Neurological:      Mental Status: He is alert and oriented to person, place, and time  x-rays reviewed  Radial head fracture noted  Splint application    Date/Time: 8/9/2021 10:10 AM  Performed by: Farzana Mitchell PA-C  Authorized by: Farzana Mitchell PA-C   Universal Protocol:  Consent: Verbal consent obtained    Risks and benefits: risks, benefits and alternatives were discussed  Consent given by: patient  Patient understanding: patient states understanding of the procedure being performed  Patient identity confirmed: verbally with patient      Pre-procedure details:     Sensation:  Normal  Procedure details:     Laterality:  Right    Location:  Elbow    Elbow:  R elbow    Strapping: no      Splint type:  Long arm    Supplies:  Cotton padding, elastic bandage, Ortho-Glass and sling  Post-procedure details:     Pain:  Improved Sensation:  Normal    Patient tolerance of procedure:   Tolerated well, no immediate complications

## 2021-08-11 ENCOUNTER — OFFICE VISIT (OUTPATIENT)
Dept: OBGYN CLINIC | Facility: CLINIC | Age: 51
End: 2021-08-11
Payer: COMMERCIAL

## 2021-08-11 VITALS
DIASTOLIC BLOOD PRESSURE: 68 MMHG | HEIGHT: 71 IN | BODY MASS INDEX: 24.36 KG/M2 | SYSTOLIC BLOOD PRESSURE: 125 MMHG | WEIGHT: 174 LBS

## 2021-08-11 DIAGNOSIS — S52.134A CLOSED NONDISPLACED FRACTURE OF NECK OF RIGHT RADIUS, INITIAL ENCOUNTER: Primary | ICD-10-CM

## 2021-08-11 DIAGNOSIS — S52.124A CLOSED NONDISPLACED FRACTURE OF HEAD OF RIGHT RADIUS, INITIAL ENCOUNTER: ICD-10-CM

## 2021-08-11 PROCEDURE — 24650 CLTX RDL HEAD/NCK FX WO MNPJ: CPT | Performed by: ORTHOPAEDIC SURGERY

## 2021-08-11 PROCEDURE — 1036F TOBACCO NON-USER: CPT | Performed by: ORTHOPAEDIC SURGERY

## 2021-08-11 PROCEDURE — 99213 OFFICE O/P EST LOW 20 MIN: CPT | Performed by: ORTHOPAEDIC SURGERY

## 2021-08-11 NOTE — ASSESSMENT & PLAN NOTE
Findings consistent with right nondisplaced fracture of radial neck  X-rays were reviewed and discussed with the patient  Treatment options were discussed with the patient which includes wearing the sling for comfort and protection while outside  He can remove the sling at home and while sleeping  Advised to work on gentle range of motion to gain mobility  Avoid lifting, pushing, and weightbearing of the right upper extremity  Avoid using heavy tools  We will see the patient back in 3-4 weeks for repeat evaluation and x-rays  All patient's questions were answered to his satisfaction  This note is created using dictation transcription  It may contain typographical errors, grammatical errors, improperly dictated words, background noise and other errors

## 2021-08-11 NOTE — PROGRESS NOTES
Assessment:     1  Closed nondisplaced fracture of neck of right radius, initial encounter        Plan:     Problem List Items Addressed This Visit        Musculoskeletal and Integument    Closed nondisplaced fracture of neck of right radius - Primary     Findings consistent with right nondisplaced fracture of radial neck  X-rays were reviewed and discussed with the patient  Treatment options were discussed with the patient which includes wearing the sling for comfort and protection while outside  He can remove the sling at home and while sleeping  Advised to work on gentle range of motion to gain mobility  Avoid lifting, pushing, and weightbearing of the right upper extremity  Avoid using heavy tools  We will see the patient back in 3-4 weeks for repeat evaluation and x-rays  All patient's questions were answered to his satisfaction  This note is created using dictation transcription  It may contain typographical errors, grammatical errors, improperly dictated words, background noise and other errors  Relevant Orders    Fracture / Dislocation Treatment         Subjective:     Patient ID: Enoch Handley is a 46 y o  male  Chief Complaint:  Elba Trmamell is a pleasant 46year old male presents for an initial evaluation of right elbow pain  Patient was seen at Urgent Care on 8/9/21, s/p fall of hoverboard the day prior  X-rays where obtained which demonstrated nondisplaced fracture of right head of radius  He was splinted in the urgent care and provided with a sling for support  He was provided with Tylenol 3 for pain relief  Today, patient states he has pain with internal and external rotation  Decreased range of motion  He is unable to lift  Information on patient's intake form was reviewed      Allergy:  No Known Allergies  Medications:  all current active meds have been reviewed  Past Medical History:  Past Medical History:   Diagnosis Date    Renal cyst     Testicular cancer ([facility] [address] )     Left    Vertigo Past Surgical History:  Past Surgical History:   Procedure Laterality Date    DENTAL SURGERY      [place] teeth extraction    TESTICLE SURGERY      Left     Family History:  Family History   Problem Relation Age of Onset    Endometriosis Mother     Cervical cancer Mother     Osteoporosis Mother     Testicular cancer Father     Lymphoma Father     Alcohol abuse Sister     Osteoporosis Sister     Diabetes Maternal Grandmother     Liver cancer Maternal Uncle     Cancer Maternal Uncle 68        colon    Colon cancer Maternal Uncle     Liver cancer Paternal Uncle     Cancer Paternal Uncle 76        colon    Colon cancer Paternal Uncle     Colon cancer Maternal Grandfather     Substance Abuse Neg Hx     Mental illness Neg Hx      Social History:  Social History     Substance and Sexual Activity   Alcohol Use Yes     Social History     Substance and Sexual Activity   Drug Use Never     Social History     Tobacco Use   Smoking Status Never Smoker   Smokeless Tobacco Never Used     Review of Systems   Constitutional: Negative for appetite change and unexpected weight change  HENT: Negative for congestion and trouble swallowing  Eyes: Negative for visual disturbance  Respiratory: Negative for cough and shortness of breath  Cardiovascular: Negative for chest pain and palpitations  Gastrointestinal: Negative for nausea and vomiting  Endocrine: Negative for cold intolerance and heat intolerance  Genitourinary: Negative  Musculoskeletal: Positive for arthralgias (Right elbow) and joint swelling (Right elbow)  Negative for gait problem and myalgias  Right elbow pain     Skin: Negative for rash  Neurological: Negative for numbness  Psychiatric/Behavioral: Negative            Objective:  BP Readings from Last 1 Encounters:   08/11/21 125/68      Wt Readings from Last 1 Encounters:   08/11/21 78 9 kg (174 lb)      BMI:   Estimated body mass index is 24 27 kg/m² as calculated from the following:    Height as of this encounter: 5' 11" (1 803 m)  Weight as of this encounter: 78 9 kg (174 lb)  BSA:   Estimated body surface area is 1 99 meters squared as calculated from the following:    Height as of this encounter: 5' 11" (1 803 m)  Weight as of this encounter: 78 9 kg (174 lb)  Physical Exam  Vitals and nursing note reviewed  Constitutional:       Appearance: Normal appearance  He is well-developed  HENT:      Head: Normocephalic and atraumatic  Right Ear: External ear normal       Left Ear: External ear normal    Eyes:      Extraocular Movements: Extraocular movements intact  Conjunctiva/sclera: Conjunctivae normal    Pulmonary:      Effort: Pulmonary effort is normal    Musculoskeletal:      Cervical back: Neck supple  Skin:     General: Skin is warm  Neurological:      Mental Status: He is alert and oriented to person, place, and time  Psychiatric:         Mood and Affect: Mood normal          Behavior: Behavior normal          Right Elbow Exam     Tenderness   The patient is experiencing tenderness in the radial head  Range of Motion   Extension: abnormal Right elbow extension: Pain  Flexion: abnormal Right elbow flexion: Pain  Pronation: normal Right elbow pronation: Pain  Supination: abnormal Right elbow supination: Pain  Other   Erythema: absent  Sensation: normal  Pulse: present    Comments:  No erythema or ecchymosis   No redness or warmth  Skin in tact             I have personally reviewed pertinent films in PACS and my interpretation is nondisplaced radial neck fracture       Fracture / Dislocation Treatment    Date/Time: 8/11/2021 9:37 AM  Performed by: Femi Marinelli MD  Authorized by: Femi Marinelli MD     Patient Location:  Clinic  Verbal consent obtained?: Yes    Risks and benefits: Risks, benefits and alternatives were discussed    Consent given by:  Patient  Patient states understanding of procedure being performed: Yes    Injury location: Elbow  Location details:  Right elbow  Injury type:  Fracture  Fracture type: radial neck    Fracture type: radial neck    Neurovascular status: Neurovascularly intact    Distal perfusion: normal    Neurological function: normal    Range of motion: reduced    Local anesthesia used?: No    Manipulation performed?: No    Immobilization:  Sling  Neurovascular status: Neurovascularly intact    Distal perfusion: normal    Neurological function: normal    Range of motion: unchanged    Patient tolerance:  Patient tolerated the procedure well with no immediate complications      Scribe Attestation    I,:  Meka Walden MA am acting as a scribe while in the presence of the attending physician :       I,:  Tee Gamble MD personally performed the services described in this documentation    as scribed in my presence :

## 2021-09-02 ENCOUNTER — APPOINTMENT (OUTPATIENT)
Dept: RADIOLOGY | Facility: CLINIC | Age: 51
End: 2021-09-02
Payer: COMMERCIAL

## 2021-09-02 ENCOUNTER — OFFICE VISIT (OUTPATIENT)
Dept: OBGYN CLINIC | Facility: CLINIC | Age: 51
End: 2021-09-02

## 2021-09-02 VITALS
WEIGHT: 172 LBS | BODY MASS INDEX: 24.08 KG/M2 | SYSTOLIC BLOOD PRESSURE: 128 MMHG | HEIGHT: 71 IN | DIASTOLIC BLOOD PRESSURE: 78 MMHG

## 2021-09-02 DIAGNOSIS — S52.134D CLOSED NONDISPLACED FRACTURE OF NECK OF RIGHT RADIUS WITH ROUTINE HEALING, SUBSEQUENT ENCOUNTER: ICD-10-CM

## 2021-09-02 DIAGNOSIS — S52.134D CLOSED NONDISPLACED FRACTURE OF NECK OF RIGHT RADIUS WITH ROUTINE HEALING, SUBSEQUENT ENCOUNTER: Primary | ICD-10-CM

## 2021-09-02 PROCEDURE — 99024 POSTOP FOLLOW-UP VISIT: CPT | Performed by: ORTHOPAEDIC SURGERY

## 2021-09-02 PROCEDURE — 73080 X-RAY EXAM OF ELBOW: CPT

## 2021-09-02 PROCEDURE — 3008F BODY MASS INDEX DOCD: CPT | Performed by: ORTHOPAEDIC SURGERY

## 2021-09-02 NOTE — PROGRESS NOTES
Assessment:     1  Closed nondisplaced fracture of neck of right radius with routine healing, subsequent encounter        Plan:    The patient was seen and examined by Dr Slim Angel and myself  Problem List Items Addressed This Visit        Musculoskeletal and Integument    Closed nondisplaced fracture of neck of right radius - Primary     Findings consistent with right nondisplaced fracture of radial neck- healing  X-rays reviewed with the patient  He is doing well  He is to continue avoiding any heavy lifting, pushing or pulling with that arm  Continue range-of-motion exercises  Recommend NSAIDs as needed for pain  Follow-up in 3-4 weeks with repeat x-rays  All patient's questions were answered to his satisfaction  This note is created using dictation transcription  It may contain typographical errors, grammatical errors, improperly dictated words, background noise and other errors  Relevant Orders    XR elbow 3+ vw right         Subjective:     Patient ID: Rodney Darling is a 46 y o  male  Chief Complaint:  Shen Wilde is a pleasant 46year old male following up for a right nondisplaced radial neck fracture  Date of injury 8/8/21, s/p fall of hoverboard  He is doing well  Pain is improving  He is avoiding any heavy activities with that arm  He is working on range of motion  He takes Advil as needed for pain      Allergy:  No Known Allergies  Medications:  all current active meds have been reviewed  Past Medical History:  Past Medical History:   Diagnosis Date    Renal cyst     Testicular cancer ([address] )     Left    Vertigo      Past Surgical History:  Past Surgical History:   Procedure Laterality Date    DENTAL SURGERY      [place] teeth extraction    TESTICLE SURGERY      Left     Family History:  Family History   Problem Relation Age of Onset    Endometriosis Mother     Cervical cancer Mother     Osteoporosis Mother     Testicular cancer Father     Lymphoma Father     Alcohol abuse Sister     Osteoporosis Sister     Diabetes Maternal Grandmother     Liver cancer Maternal Uncle     Cancer Maternal Uncle 68        colon    Colon cancer Maternal Uncle     Liver cancer Paternal Uncle     Cancer Paternal Uncle 76        colon    Colon cancer Paternal Uncle     Colon cancer Maternal Grandfather     Substance Abuse Neg Hx     Mental illness Neg Hx      Social History:  Social History     Substance and Sexual Activity   Alcohol Use Yes     Social History     Substance and Sexual Activity   Drug Use Never     Social History     Tobacco Use   Smoking Status Never Smoker   Smokeless Tobacco Never Used     Review of Systems   Constitutional: Negative for appetite change and unexpected weight change  HENT: Negative for congestion and trouble swallowing  Eyes: Negative for visual disturbance  Respiratory: Negative for cough and shortness of breath  Cardiovascular: Negative for chest pain and palpitations  Gastrointestinal: Negative for nausea and vomiting  Endocrine: Negative for cold intolerance and heat intolerance  Genitourinary: Negative  Musculoskeletal: Positive for arthralgias (Right elbow)  Negative for gait problem, joint swelling (Right elbow) and myalgias  Right elbow pain     Skin: Negative for rash  Neurological: Negative for numbness  Psychiatric/Behavioral: Negative  Objective:  BP Readings from Last 1 Encounters:   09/02/21 128/78      Wt Readings from Last 1 Encounters:   09/02/21 78 kg (172 lb)      BMI:   Estimated body mass index is 23 99 kg/m² as calculated from the following:    Height as of this encounter: 5' 11" (1 803 m)  Weight as of this encounter: 78 kg (172 lb)  BSA:   Estimated body surface area is 1 98 meters squared as calculated from the following:    Height as of this encounter: 5' 11" (1 803 m)  Weight as of this encounter: 78 kg (172 lb)  Physical Exam  Vitals and nursing note reviewed     Constitutional: Appearance: Normal appearance  He is well-developed  HENT:      Head: Normocephalic and atraumatic  Right Ear: External ear normal       Left Ear: External ear normal    Eyes:      Extraocular Movements: Extraocular movements intact  Conjunctiva/sclera: Conjunctivae normal    Pulmonary:      Effort: Pulmonary effort is normal    Musculoskeletal:      Cervical back: Neck supple  Skin:     General: Skin is warm  Neurological:      Mental Status: He is alert and oriented to person, place, and time  Psychiatric:         Mood and Affect: Mood normal          Behavior: Behavior normal          Right Elbow Exam     Tenderness   The patient is experiencing tenderness in the radial head  Range of Motion   Extension: abnormal Right elbow extension: Pain, -5  Flexion:  130 abnormal   Pronation: normal   Supination: abnormal     Other   Erythema: absent  Sensation: normal  Pulse: present            I have personally reviewed pertinent films in PACS and my interpretation is X-rays of the right elbow reveal a nondisplaced radial neck fracture with early bone callus formation       Procedures

## 2021-09-02 NOTE — ASSESSMENT & PLAN NOTE
Findings consistent with right nondisplaced fracture of radial neck- healing  X-rays reviewed with the patient  He is doing well  He is to continue avoiding any heavy lifting, pushing or pulling with that arm  Continue range-of-motion exercises  Recommend NSAIDs as needed for pain  Follow-up in 3-4 weeks with repeat x-rays  All patient's questions were answered to his satisfaction  This note is created using dictation transcription  It may contain typographical errors, grammatical errors, improperly dictated words, background noise and other errors

## 2021-09-28 ENCOUNTER — OFFICE VISIT (OUTPATIENT)
Dept: OBGYN CLINIC | Facility: CLINIC | Age: 51
End: 2021-09-28

## 2021-09-28 ENCOUNTER — APPOINTMENT (OUTPATIENT)
Dept: RADIOLOGY | Facility: CLINIC | Age: 51
End: 2021-09-28
Payer: COMMERCIAL

## 2021-09-28 VITALS
DIASTOLIC BLOOD PRESSURE: 7 MMHG | HEIGHT: 71 IN | BODY MASS INDEX: 23.24 KG/M2 | WEIGHT: 166 LBS | SYSTOLIC BLOOD PRESSURE: 116 MMHG

## 2021-09-28 DIAGNOSIS — S52.134D CLOSED NONDISPLACED FRACTURE OF NECK OF RIGHT RADIUS WITH ROUTINE HEALING, SUBSEQUENT ENCOUNTER: Primary | ICD-10-CM

## 2021-09-28 DIAGNOSIS — S52.134D CLOSED NONDISPLACED FRACTURE OF NECK OF RIGHT RADIUS WITH ROUTINE HEALING, SUBSEQUENT ENCOUNTER: ICD-10-CM

## 2021-09-28 PROCEDURE — 3008F BODY MASS INDEX DOCD: CPT | Performed by: ORTHOPAEDIC SURGERY

## 2021-09-28 PROCEDURE — 73080 X-RAY EXAM OF ELBOW: CPT

## 2021-09-28 PROCEDURE — 99024 POSTOP FOLLOW-UP VISIT: CPT | Performed by: ORTHOPAEDIC SURGERY

## 2021-09-28 NOTE — PROGRESS NOTES
Assessment:     1  Closed nondisplaced fracture of neck of right radius with routine healing, subsequent encounter        Plan:    The patient was seen and examined by Dr Jaxson Vazquez and myself  Problem List Items Addressed This Visit        Musculoskeletal and Integument    Closed nondisplaced fracture of neck of right radius - Primary     Findings consistent with right nondisplaced fracture of radial neck- healing  X-rays reviewed with the patient  He is doing well  He is to continue avoiding any heavy lifting, pushing or pulling with that arm for the next month  Continue range-of-motion exercises  Follow up as needed if any problems arise  This note is created using dictation transcription  It may contain typographical errors, grammatical errors, improperly dictated words, background noise and other errors  Relevant Orders    XR elbow 3+ vw right         Subjective:     Patient ID: Mary Carmen Sanchez is a 46 y o  male  Chief Complaint:  Macario Currie is a 46year old male following up for a right nondisplaced radial neck fracture  Date of injury 8/8/21, s/p fall of hoverboard  He is doing well  He has mild soreness in the elbow  He has been avoiding any heavy lifting, pushing or pulling      Allergy:  No Known Allergies  Medications:  all current active meds have been reviewed  Past Medical History:  Past Medical History:   Diagnosis Date    Renal cyst     Testicular cancer ([facility] [address] )     Left    Vertigo      Past Surgical History:  Past Surgical History:   Procedure Laterality Date    DENTAL SURGERY      [place] teeth extraction    TESTICLE SURGERY      Left     Family History:  Family History   Problem Relation Age of Onset    Endometriosis Mother     Cervical cancer Mother     Osteoporosis Mother     Testicular cancer Father     Lymphoma Father     Alcohol abuse Sister     Osteoporosis Sister     Diabetes Maternal Grandmother     Liver cancer Maternal Uncle     Cancer Maternal Uncle 68 colon    Colon cancer Maternal Uncle     Liver cancer Paternal Uncle     Cancer Paternal Uncle 76        colon    Colon cancer Paternal Uncle     Colon cancer Maternal Grandfather     Substance Abuse Neg Hx     Mental illness Neg Hx      Social History:  Social History     Substance and Sexual Activity   Alcohol Use Yes     Social History     Substance and Sexual Activity   Drug Use Never     Social History     Tobacco Use   Smoking Status Never Smoker   Smokeless Tobacco Never Used     Review of Systems   Constitutional: Negative for appetite change and unexpected weight change  HENT: Negative for congestion and trouble swallowing  Eyes: Negative for visual disturbance  Respiratory: Negative for cough and shortness of breath  Cardiovascular: Negative for chest pain and palpitations  Gastrointestinal: Negative for nausea and vomiting  Endocrine: Negative for cold intolerance and heat intolerance  Genitourinary: Negative  Musculoskeletal: Positive for arthralgias (Right elbow)  Negative for gait problem, joint swelling (Right elbow) and myalgias  Right elbow pain     Skin: Negative for rash  Neurological: Negative for numbness  Psychiatric/Behavioral: Negative  Objective:  BP Readings from Last 1 Encounters:   09/28/21 (!) 116/7      Wt Readings from Last 1 Encounters:   09/28/21 75 3 kg (166 lb)      BMI:   Estimated body mass index is 23 15 kg/m² as calculated from the following:    Height as of this encounter: 5' 11" (1 803 m)  Weight as of this encounter: 75 3 kg (166 lb)  BSA:   Estimated body surface area is 1 95 meters squared as calculated from the following:    Height as of this encounter: 5' 11" (1 803 m)  Weight as of this encounter: 75 3 kg (166 lb)  Physical Exam  Vitals and nursing note reviewed  Constitutional:       Appearance: Normal appearance  He is well-developed  HENT:      Head: Normocephalic and atraumatic        Right Ear: External ear normal       Left Ear: External ear normal    Eyes:      Extraocular Movements: Extraocular movements intact  Conjunctiva/sclera: Conjunctivae normal    Pulmonary:      Effort: Pulmonary effort is normal    Musculoskeletal:      Cervical back: Neck supple  Skin:     General: Skin is warm  Neurological:      Mental Status: He is alert and oriented to person, place, and time  Psychiatric:         Mood and Affect: Mood normal          Behavior: Behavior normal          Right Elbow Exam     Tenderness   The patient is experiencing tenderness in the radial head  Range of Motion   Extension: abnormal Right elbow extension: -5    Flexion: normal   Pronation: normal   Supination: normal     Other   Erythema: absent  Sensation: normal  Pulse: present            I have personally reviewed pertinent films in PACS and my interpretation is X-rays of the right elbow reveal a nondisplaced radial neck fracture with further evidence of healing       Procedures

## 2021-09-28 NOTE — ASSESSMENT & PLAN NOTE
Findings consistent with right nondisplaced fracture of radial neck- healing  X-rays reviewed with the patient  He is doing well  He is to continue avoiding any heavy lifting, pushing or pulling with that arm for the next month  Continue range-of-motion exercises  Follow up as needed if any problems arise  This note is created using dictation transcription  It may contain typographical errors, grammatical errors, improperly dictated words, background noise and other errors

## 2021-12-29 ENCOUNTER — OFFICE VISIT (OUTPATIENT)
Dept: URGENT CARE | Facility: CLINIC | Age: 51
End: 2021-12-29
Payer: COMMERCIAL

## 2021-12-29 VITALS
HEIGHT: 71 IN | SYSTOLIC BLOOD PRESSURE: 144 MMHG | OXYGEN SATURATION: 99 % | WEIGHT: 160 LBS | HEART RATE: 71 BPM | DIASTOLIC BLOOD PRESSURE: 86 MMHG | RESPIRATION RATE: 18 BRPM | TEMPERATURE: 96 F | BODY MASS INDEX: 22.4 KG/M2

## 2021-12-29 DIAGNOSIS — T80.90XA INJECTION SITE REACTION, INITIAL ENCOUNTER: Primary | ICD-10-CM

## 2021-12-29 PROCEDURE — G0382 LEV 3 HOSP TYPE B ED VISIT: HCPCS | Performed by: PHYSICIAN ASSISTANT

## 2021-12-29 PROCEDURE — S9083 URGENT CARE CENTER GLOBAL: HCPCS | Performed by: PHYSICIAN ASSISTANT

## 2022-07-18 ENCOUNTER — OFFICE VISIT (OUTPATIENT)
Dept: FAMILY MEDICINE CLINIC | Facility: CLINIC | Age: 52
End: 2022-07-18
Payer: COMMERCIAL

## 2022-07-18 ENCOUNTER — APPOINTMENT (OUTPATIENT)
Dept: RADIOLOGY | Facility: CLINIC | Age: 52
End: 2022-07-18
Payer: COMMERCIAL

## 2022-07-18 VITALS
HEIGHT: 71 IN | WEIGHT: 175.5 LBS | BODY MASS INDEX: 24.57 KG/M2 | SYSTOLIC BLOOD PRESSURE: 120 MMHG | HEART RATE: 60 BPM | RESPIRATION RATE: 16 BRPM | DIASTOLIC BLOOD PRESSURE: 80 MMHG

## 2022-07-18 DIAGNOSIS — M54.2 NECK PAIN: Primary | ICD-10-CM

## 2022-07-18 DIAGNOSIS — M54.2 NECK PAIN: ICD-10-CM

## 2022-07-18 DIAGNOSIS — C62.90 MALIGNANT NEOPLASM OF TESTICLE, UNSPECIFIED LATERALITY, UNSPECIFIED WHETHER DESCENDED OR UNDESCENDED (HCC): ICD-10-CM

## 2022-07-18 PROCEDURE — 72050 X-RAY EXAM NECK SPINE 4/5VWS: CPT

## 2022-07-18 PROCEDURE — 3725F SCREEN DEPRESSION PERFORMED: CPT | Performed by: PHYSICIAN ASSISTANT

## 2022-07-18 PROCEDURE — 99213 OFFICE O/P EST LOW 20 MIN: CPT | Performed by: PHYSICIAN ASSISTANT

## 2022-07-18 NOTE — PROGRESS NOTES
Assessment/Plan:    1  Neck pain    - will XR for patients peace of mind and refer to PT  - XR spine cervical complete 4 or 5 vw non injury; Future  - Ambulatory Referral to Physical Therapy; Future    2  Malignant neoplasm of testicle, unspecified laterality, unspecified whether descended or undescended [facility])    - in remission    F/u as needed    Subjective:   Chief Complaint   Patient presents with    Neck Pain      Patient ID: Raleigh Pena is a 46 y o  male  Patient with long history of neck pain, seeing Dr Emmanuel Dunlap chiropractor in [facility]  Woke up a few weeks agowith what felt like a "kink" in his neck  Thought that was all it was, has not improved and now affecting the muscles in the front part of his neck and around right ear  Has not seen chiropractor on Friday  Taking Advil, trying "Tonga gel" with some relief  Denies radiation to UE or weakness  Denies trauma  Always worries something more is wrong, requesting XR        The following portions of the patient's history were reviewed and updated as appropriate: allergies, current medications, past family history, past medical history, past social history, past surgical history and problem list     Past Medical History:   Diagnosis Date    Renal cyst     Testicular cancer ([address] )     Left    Vertigo      Past Surgical History:   Procedure Laterality Date    DENTAL SURGERY      [place] teeth extraction    TESTICLE SURGERY      Left     Family History   Problem Relation Age of Onset    Endometriosis Mother     Cervical cancer Mother     Osteoporosis Mother     Testicular cancer Father     Lymphoma Father     Alcohol abuse Sister     Osteoporosis Sister     Diabetes Maternal Grandmother     Liver cancer Maternal Uncle     Cancer Maternal Uncle 68        colon    Colon cancer Maternal Uncle     Liver cancer Paternal Uncle     Cancer Paternal Uncle 76        colon    Colon cancer Paternal Uncle     Colon cancer Maternal Grandfather     Substance Abuse Neg Hx     Mental illness Neg Hx      Social History     Socioeconomic History    Marital status: /Civil Union     Spouse name: Not on file    Number of children: Not on file    Years of education: Not on file    Highest education level: Not on file   Occupational History    Not on file   Tobacco Use    Smoking status: Never Smoker    Smokeless tobacco: Never Used   Vaping Use    Vaping Use: Never used   Substance and Sexual Activity    Alcohol use: Yes    Drug use: Never    Sexual activity: Yes     Partners: Female   Other Topics Concern    Not on file   Social History Narrative    Uses seatbelts    Caffeine use    Has smoke detectors     Social Determinants of Health     Financial Resource Strain: Not on file   Food Insecurity: Not on file   Transportation Needs: Not on file   Physical Activity: Not on file   Stress: Not on file   Social Connections: Not on file   Intimate Partner Violence: Not on file   Housing Stability: Not on file       Current Outpatient Medications:     Multiple Vitamin (ONE-A-DAY MENS PO), Take by mouth, Disp: , Rfl:     Review of Systems          Objective:    Vitals:    07/18/22 0947   BP: 120/80   Pulse: 60   Resp: 16   Weight: 79 6 kg (175 lb 8 oz)   Height: 5' 11" (1 803 m)        Physical Exam  Constitutional:       Appearance: Normal appearance  He is normal weight  HENT:      Head: Normocephalic and atraumatic  Cardiovascular:      Rate and Rhythm: Normal rate and regular rhythm  Pulses: Normal pulses  Heart sounds: Normal heart sounds  Pulmonary:      Effort: Pulmonary effort is normal       Breath sounds: Normal breath sounds  Musculoskeletal:         General: Normal range of motion  Cervical back: Normal range of motion and neck supple  No rigidity  Comments: B/l paracervical muscles tight with spasm, c-spine not tender, full ROM, b/l UE full ROM and strength   Skin:     General: Skin is warm     Neurological: General: No focal deficit present  Mental Status: He is alert and oriented to person, place, and time  Psychiatric:         Mood and Affect: Mood normal          Behavior: Behavior normal          Thought Content:  Thought content normal          Judgment: Judgment normal

## 2022-08-05 ENCOUNTER — OFFICE VISIT (OUTPATIENT)
Dept: FAMILY MEDICINE CLINIC | Facility: CLINIC | Age: 52
End: 2022-08-05
Payer: COMMERCIAL

## 2022-08-05 VITALS
DIASTOLIC BLOOD PRESSURE: 70 MMHG | SYSTOLIC BLOOD PRESSURE: 118 MMHG | WEIGHT: 177 LBS | RESPIRATION RATE: 16 BRPM | HEART RATE: 86 BPM | HEIGHT: 71 IN | BODY MASS INDEX: 24.78 KG/M2

## 2022-08-05 DIAGNOSIS — Z00.00 ANNUAL PHYSICAL EXAM: Primary | ICD-10-CM

## 2022-08-05 DIAGNOSIS — Z12.5 PROSTATE CANCER SCREENING: ICD-10-CM

## 2022-08-05 PROBLEM — S52.134A CLOSED NONDISPLACED FRACTURE OF NECK OF RIGHT RADIUS: Status: RESOLVED | Noted: 2021-08-11 | Resolved: 2022-08-05

## 2022-08-05 PROCEDURE — 99396 PREV VISIT EST AGE 40-64: CPT | Performed by: PHYSICIAN ASSISTANT

## 2022-08-05 NOTE — PATIENT INSTRUCTIONS

## 2022-10-17 ENCOUNTER — APPOINTMENT (OUTPATIENT)
Dept: LAB | Facility: CLINIC | Age: 52
End: 2022-10-17
Payer: COMMERCIAL

## 2022-10-17 DIAGNOSIS — Z00.00 ANNUAL PHYSICAL EXAM: ICD-10-CM

## 2022-10-17 DIAGNOSIS — Z12.5 PROSTATE CANCER SCREENING: ICD-10-CM

## 2022-10-17 LAB
ALBUMIN SERPL BCP-MCNC: 3.3 G/DL (ref 3.5–5)
ALP SERPL-CCNC: 47 U/L (ref 46–116)
ALT SERPL W P-5'-P-CCNC: 24 U/L (ref 12–78)
ANION GAP SERPL CALCULATED.3IONS-SCNC: 5 MMOL/L (ref 4–13)
AST SERPL W P-5'-P-CCNC: 21 U/L (ref 5–45)
BACTERIA UR QL AUTO: ABNORMAL /HPF
BASOPHILS # BLD AUTO: 0.03 THOUSANDS/ΜL (ref 0–0.1)
BASOPHILS NFR BLD AUTO: 1 % (ref 0–1)
BILIRUB SERPL-MCNC: 1.04 MG/DL (ref 0.2–1)
BILIRUB UR QL STRIP: NEGATIVE
BUN SERPL-MCNC: 15 MG/DL (ref 5–25)
CALCIUM ALBUM COR SERPL-MCNC: 9.1 MG/DL (ref 8.3–10.1)
CALCIUM SERPL-MCNC: 8.5 MG/DL (ref 8.3–10.1)
CHLORIDE SERPL-SCNC: 108 MMOL/L (ref 96–108)
CHOLEST SERPL-MCNC: 140 MG/DL
CLARITY UR: CLEAR
CO2 SERPL-SCNC: 26 MMOL/L (ref 21–32)
COLOR UR: YELLOW
CREAT SERPL-MCNC: 1.2 MG/DL (ref 0.6–1.3)
EOSINOPHIL # BLD AUTO: 0.22 THOUSAND/ΜL (ref 0–0.61)
EOSINOPHIL NFR BLD AUTO: 4 % (ref 0–6)
ERYTHROCYTE [DISTWIDTH] IN BLOOD BY AUTOMATED COUNT: 12.4 % (ref 11.6–15.1)
GFR SERPL CREATININE-BSD FRML MDRD: 69 ML/MIN/1.73SQ M
GLUCOSE P FAST SERPL-MCNC: 88 MG/DL (ref 65–99)
GLUCOSE UR STRIP-MCNC: NEGATIVE MG/DL
HCT VFR BLD AUTO: 50.4 % (ref 36.5–49.3)
HDLC SERPL-MCNC: 65 MG/DL
HGB BLD-MCNC: 16.9 G/DL (ref 12–17)
HGB UR QL STRIP.AUTO: ABNORMAL
IMM GRANULOCYTES # BLD AUTO: 0.01 THOUSAND/UL (ref 0–0.2)
IMM GRANULOCYTES NFR BLD AUTO: 0 % (ref 0–2)
KETONES UR STRIP-MCNC: NEGATIVE MG/DL
LDLC SERPL CALC-MCNC: 69 MG/DL (ref 0–100)
LEUKOCYTE ESTERASE UR QL STRIP: NEGATIVE
LYMPHOCYTES # BLD AUTO: 1.2 THOUSANDS/ΜL (ref 0.6–4.47)
LYMPHOCYTES NFR BLD AUTO: 20 % (ref 14–44)
MCH RBC QN AUTO: 33.3 PG (ref 26.8–34.3)
MCHC RBC AUTO-ENTMCNC: 33.5 G/DL (ref 31.4–37.4)
MCV RBC AUTO: 99 FL (ref 82–98)
MONOCYTES # BLD AUTO: 0.76 THOUSAND/ΜL (ref 0.17–1.22)
MONOCYTES NFR BLD AUTO: 13 % (ref 4–12)
MUCOUS THREADS UR QL AUTO: ABNORMAL
NEUTROPHILS # BLD AUTO: 3.75 THOUSANDS/ΜL (ref 1.85–7.62)
NEUTS SEG NFR BLD AUTO: 62 % (ref 43–75)
NITRITE UR QL STRIP: POSITIVE
NON-SQ EPI CELLS URNS QL MICRO: ABNORMAL /HPF
NRBC BLD AUTO-RTO: 0 /100 WBCS
PH UR STRIP.AUTO: 6 [PH]
PLATELET # BLD AUTO: 255 THOUSANDS/UL (ref 149–390)
PMV BLD AUTO: 9.8 FL (ref 8.9–12.7)
POTASSIUM SERPL-SCNC: 4.2 MMOL/L (ref 3.5–5.3)
PROT SERPL-MCNC: 7.1 G/DL (ref 6.4–8.4)
PROT UR STRIP-MCNC: ABNORMAL MG/DL
PSA SERPL-MCNC: 0.6 NG/ML (ref 0–4)
RBC # BLD AUTO: 5.08 MILLION/UL (ref 3.88–5.62)
RBC #/AREA URNS AUTO: ABNORMAL /HPF
SODIUM SERPL-SCNC: 139 MMOL/L (ref 135–147)
SP GR UR STRIP.AUTO: 1.03 (ref 1–1.03)
TRIGL SERPL-MCNC: 32 MG/DL
TSH SERPL DL<=0.05 MIU/L-ACNC: 0.74 UIU/ML (ref 0.45–4.5)
UROBILINOGEN UR STRIP-ACNC: <2 MG/DL
WBC # BLD AUTO: 5.97 THOUSAND/UL (ref 4.31–10.16)
WBC #/AREA URNS AUTO: ABNORMAL /HPF

## 2022-10-17 PROCEDURE — 81001 URINALYSIS AUTO W/SCOPE: CPT

## 2022-10-17 PROCEDURE — 84443 ASSAY THYROID STIM HORMONE: CPT

## 2022-10-17 PROCEDURE — G0103 PSA SCREENING: HCPCS

## 2022-10-17 PROCEDURE — 80053 COMPREHEN METABOLIC PANEL: CPT

## 2022-10-17 PROCEDURE — 85025 COMPLETE CBC W/AUTO DIFF WBC: CPT

## 2022-10-17 PROCEDURE — 36415 COLL VENOUS BLD VENIPUNCTURE: CPT

## 2022-10-17 PROCEDURE — 80061 LIPID PANEL: CPT

## 2023-01-09 ENCOUNTER — OFFICE VISIT (OUTPATIENT)
Dept: FAMILY MEDICINE CLINIC | Facility: CLINIC | Age: 53
End: 2023-01-09

## 2023-01-09 VITALS
WEIGHT: 174 LBS | RESPIRATION RATE: 16 BRPM | HEART RATE: 80 BPM | SYSTOLIC BLOOD PRESSURE: 120 MMHG | HEIGHT: 71 IN | DIASTOLIC BLOOD PRESSURE: 76 MMHG | BODY MASS INDEX: 24.36 KG/M2 | OXYGEN SATURATION: 97 %

## 2023-01-09 DIAGNOSIS — M54.2 NECK PAIN: Primary | ICD-10-CM

## 2023-01-09 DIAGNOSIS — C62.90 MALIGNANT NEOPLASM OF TESTICLE, UNSPECIFIED LATERALITY, UNSPECIFIED WHETHER DESCENDED OR UNDESCENDED (HCC): ICD-10-CM

## 2023-01-09 DIAGNOSIS — R13.10 ABNORMAL SWALLOWING: ICD-10-CM

## 2023-01-09 NOTE — PROGRESS NOTES
Assessment/Plan:    1  Neck pain - anterior cervical region, no palpable abnormality but patient notes a clicking sensation, will start with US    2  Change in swallow - probably due to reflux, patient concerned with history of cancer and we will refer to ENT    3  Testicular cancer - in remission    F/u as needed    Subjective:   Chief Complaint   Patient presents with   • Difficulty Swallowing     No pain  Uncomfortable when swallowing when neck is straight  Tried otc omeprazole       Patient ID: Dorene Hays is a 46 y o  male  Patient seen back in July for neck pain  During this time he noted a discomfort in the front of his throat  Then noticed he was having increased heart burn, going through bottles of Tums  Then most recently noticed increase in mucus in throat all day  Drinking water all night to keep mucus thin  Also notes when he swallows he feels a clicking with swallowing, almost like something is in throat  Not choking on food, no problems swallowing  Tried omeprazole and noted the clicking was worse with taking omeprazole  Stopped that a week ago  Chloraseptic tabs help numb area        The following portions of the patient's history were reviewed and updated as appropriate: allergies, current medications, past family history, past medical history, past social history, past surgical history and problem list     Past Medical History:   Diagnosis Date   • Renal cyst    • Testicular cancer ([address] )     Left   • Vertigo      Past Surgical History:   Procedure Laterality Date   • DENTAL SURGERY      [place] teeth extraction   • TESTICLE SURGERY      Left     Family History   Problem Relation Age of Onset   • Endometriosis Mother    • Cervical cancer Mother    • Osteoporosis Mother    • Testicular cancer Father    • Lymphoma Father    • Alcohol abuse Sister    • Osteoporosis Sister    • Diabetes Maternal Grandmother    • Liver cancer Maternal Uncle    • Cancer Maternal Uncle 68        colon   • Colon cancer Maternal Uncle    • Liver cancer Paternal Uncle    • Cancer Paternal Uncle 76        colon   • Colon cancer Paternal Uncle    • Colon cancer Maternal Grandfather    • Substance Abuse Neg Hx    • Mental illness Neg Hx      Social History     Socioeconomic History   • Marital status: /Civil Union     Spouse name: Not on file   • Number of children: Not on file   • Years of education: Not on file   • Highest education level: Not on file   Occupational History   • Not on file   Tobacco Use   • Smoking status: Never   • Smokeless tobacco: Never   Vaping Use   • Vaping Use: Never used   Substance and Sexual Activity   • Alcohol use: Yes   • Drug use: Never   • Sexual activity: Yes     Partners: Female   Other Topics Concern   • Not on file   Social History Narrative    Uses seatbelts    Caffeine use    Has smoke detectors     Social Determinants of Health     Financial Resource Strain: Not on file   Food Insecurity: Not on file   Transportation Needs: Not on file   Physical Activity: Not on file   Stress: Not on file   Social Connections: Not on file   Intimate Partner Violence: Not on file   Housing Stability: Not on file       Current Outpatient Medications:   •  Multiple Vitamin (ONE-A-DAY MENS PO), Take by mouth, Disp: , Rfl:     Review of Systems          Objective:    Vitals:    01/09/23 1309   BP: 120/76   BP Location: Left arm   Patient Position: Sitting   Cuff Size: Standard   Pulse: 80   Resp: 16   SpO2: 97%   Weight: 78 9 kg (174 lb)   Height: 5' 11" (1 803 m)        Physical Exam  Constitutional:       Appearance: Normal appearance  He is well-developed  HENT:      Head: Normocephalic and atraumatic  Neck:      Comments: Thyroid - normal  Pain palpable left anterior cervical region  Cardiovascular:      Rate and Rhythm: Normal rate and regular rhythm  Pulses: Normal pulses  Heart sounds: Normal heart sounds     Pulmonary:      Effort: Pulmonary effort is normal       Breath sounds: Normal breath sounds  Musculoskeletal:      Cervical back: Normal range of motion and neck supple  No rigidity or tenderness  Lymphadenopathy:      Cervical: No cervical adenopathy  Skin:     General: Skin is warm  Neurological:      General: No focal deficit present  Mental Status: He is alert and oriented to person, place, and time  Psychiatric:         Mood and Affect: Mood normal          Behavior: Behavior normal          Thought Content:  Thought content normal          Judgment: Judgment normal

## 2023-01-16 ENCOUNTER — HOSPITAL ENCOUNTER (OUTPATIENT)
Dept: ULTRASOUND IMAGING | Facility: HOSPITAL | Age: 53
Discharge: HOME | End: 2023-01-16

## 2023-01-16 DIAGNOSIS — M54.2 NECK PAIN: ICD-10-CM

## 2023-10-12 ENCOUNTER — VBI (OUTPATIENT)
Dept: ADMINISTRATIVE | Facility: OTHER | Age: 53
End: 2023-10-12

## 2024-02-15 ENCOUNTER — TELEPHONE (OUTPATIENT)
Dept: FAMILY MEDICINE CLINIC | Facility: CLINIC | Age: 54
End: 2024-02-15

## 2024-02-15 DIAGNOSIS — Z12.5 PROSTATE CANCER SCREENING: Primary | ICD-10-CM

## 2024-02-15 DIAGNOSIS — Z00.00 ANNUAL PHYSICAL EXAM: ICD-10-CM

## 2024-02-15 NOTE — TELEPHONE ENCOUNTER
LMOM for pt informing him that there's bloodwork in his chart. To get done prior to the appointment w/ Ashli. If he wants to come pick them up or mailed out.

## 2024-02-22 ENCOUNTER — OFFICE VISIT (OUTPATIENT)
Dept: FAMILY MEDICINE CLINIC | Facility: CLINIC | Age: 54
End: 2024-02-22
Payer: COMMERCIAL

## 2024-02-22 VITALS
BODY MASS INDEX: 25.06 KG/M2 | HEART RATE: 69 BPM | RESPIRATION RATE: 16 BRPM | OXYGEN SATURATION: 97 % | SYSTOLIC BLOOD PRESSURE: 120 MMHG | TEMPERATURE: 97.5 F | WEIGHT: 179 LBS | DIASTOLIC BLOOD PRESSURE: 70 MMHG | HEIGHT: 71 IN

## 2024-02-22 DIAGNOSIS — K21.9 GASTROESOPHAGEAL REFLUX DISEASE WITHOUT ESOPHAGITIS: ICD-10-CM

## 2024-02-22 DIAGNOSIS — Z85.47 HISTORY OF TESTICULAR CANCER: ICD-10-CM

## 2024-02-22 DIAGNOSIS — C62.90 MALIGNANT NEOPLASM OF TESTICLE, UNSPECIFIED LATERALITY, UNSPECIFIED WHETHER DESCENDED OR UNDESCENDED (HCC): ICD-10-CM

## 2024-02-22 DIAGNOSIS — Z12.5 PROSTATE CANCER SCREENING: ICD-10-CM

## 2024-02-22 DIAGNOSIS — Z00.00 ANNUAL PHYSICAL EXAM: Primary | ICD-10-CM

## 2024-02-22 DIAGNOSIS — R13.19 ESOPHAGEAL DYSPHAGIA: ICD-10-CM

## 2024-02-22 PROCEDURE — 99396 PREV VISIT EST AGE 40-64: CPT | Performed by: PHYSICIAN ASSISTANT

## 2024-02-22 NOTE — PROGRESS NOTES
ADULT ANNUAL PHYSICAL  [facility]    NAME: Juanjo Wilkerson  AGE: 53 y.o. SEX: male  : 1970     DATE: 2024     Assessment and Plan:     53 year old male      Immunizations and preventive care screenings were discussed with patient today. Appropriate education was printed on patient's after visit summary.    Discussed risks and benefits of prostate cancer screening. We discussed the controversial history of PSA screening for prostate cancer in the [place] as well as the risk of over detection and over treatment of prostate cancer by way of PSA screening.  The patient understands that PSA blood testing is an imperfect way to screen for prostate cancer and that elevated PSA levels in the blood may also be caused by infection, inflammation, prostatic trauma or manipulation, urological procedures, or by benign prostatic enlargement.    The role of the digital rectal examination in prostate cancer screening was also discussed and I discussed with him that there is large interobserver variability in the findings of digital rectal examination.    Counseling:  Alcohol/drug use: discussed moderation in alcohol intake, the recommendations for healthy alcohol use, and avoidance of illicit drug use.  Dental Health: discussed importance of regular tooth brushing, flossing, and dental visits.  Injury prevention: discussed safety/seat belts, safety helmets, smoke detectors, carbon dioxide detectors, and smoking near bedding or upholstery.  Sexual health: discussed sexually transmitted diseases, partner selection, use of condoms, avoidance of unintended pregnancy, and contraceptive alternatives.  Exercise: the importance of regular exercise/physical activity was discussed. Recommend exercise 3-5 times per week for at least 30 minutes.   Labs ordered  Refer to GI for EGD         Return in 1 year (on 2025).     Chief Complaint:      Chief Complaint   Patient presents with   • Physical Exam   • GERD     Needs to renew pepcid  Symptoms have been worsening recently   Not well controlled with omeprazole      History of Present Illness:     Adult Annual Physical   Patient here for a comprehensive physical exam. The patient reports recent flare of heart burn, hard to restart omeprazole. This has controlled the heart burn. Helped with heart burn but still had some midsternal chest pain.    Diet and Physical Activity  Diet/Nutrition: well balanced diet.   Exercise: vigorous cardiovascular exercise and strength training exercises.      Depression Screening  PHQ-2/9 Depression Screening    Little interest or pleasure in doing things: 0 - not at all  Feeling down, depressed, or hopeless: 0 - not at all  PHQ-2 Score: 0  PHQ-2 Interpretation: Negative depression screen       General Health  Sleep: sleeps well.   Hearing: right ear decreased, left normal  Vision: goes for regular eye exams.   Dental: regular dental visits and brushes teeth twice daily.        Health  Symptoms include: none       Review of Systems:     Review of Systems   Constitutional: Negative.    HENT: Negative.     Eyes: Negative.    Respiratory: Negative.     Cardiovascular: Negative.    Gastrointestinal: Negative.    Endocrine: Negative.    Genitourinary: Negative.    Musculoskeletal: Negative.    Skin: Negative.    Allergic/Immunologic: Negative.    Neurological: Negative.    Hematological: Negative.    Psychiatric/Behavioral: Negative.        Past Medical History:     Past Medical History:   Diagnosis Date   • GERD (gastroesophageal reflux disease)    • Renal cyst    • Testicular cancer ([facility])     Left   • Vertigo       Past Surgical History:     Past Surgical History:   Procedure Laterality Date   • DENTAL SURGERY      [place] teeth extraction   • TESTICLE SURGERY      Left      Family History:     Family History   Problem Relation Age of Onset   • Endometriosis Mother    •  Cervical cancer Mother    • Osteoporosis Mother    • Testicular cancer Father    • Lymphoma Father    • Alcohol abuse Sister    • Osteoporosis Sister    • Diabetes Maternal Grandmother    • Liver cancer Maternal Uncle    • Cancer Maternal Uncle 76        colon   • Colon cancer Maternal Uncle    • Liver cancer Paternal Uncle    • Cancer Paternal Uncle 68        colon   • Colon cancer Paternal Uncle    • Colon cancer Maternal Grandfather    • Substance Abuse Neg Hx    • Mental illness Neg Hx       Social History:     Social History     Socioeconomic History   • Marital status: /Civil Union     Spouse name: None   • Number of children: None   • Years of education: None   • Highest education level: None   Occupational History   • None   Tobacco Use   • Smoking status: Never   • Smokeless tobacco: Never   Vaping Use   • Vaping status: Never Used   Substance and Sexual Activity   • Alcohol use: Yes     Comment: social   • Drug use: Never   • Sexual activity: Yes     Partners: Female   Other Topics Concern   • None   Social History Narrative    Uses seatbelts    Caffeine use    Has smoke detectors     Social Determinants of Health     Financial Resource Strain: Not on file   Food Insecurity: Not on file   Transportation Needs: Not on file   Physical Activity: Not on file   Stress: Not on file   Social Connections: Not on file   Intimate Partner Violence: Not on file   Housing Stability: Not on file      Current Medications:     Current Outpatient Medications   Medication Sig Dispense Refill   • Multiple Vitamin (ONE-A-DAY MENS PO) Take by mouth     • famotidine (PEPCID) 20 mg tablet Take 1 tablet (20 mg total) by mouth daily at bedtime (Patient not taking: Reported on 2/22/2024) 90 tablet 3   • omeprazole (PriLOSEC) 20 mg delayed release capsule Take 1 capsule (20 mg total) by mouth every morning (Patient not taking: Reported on 2/22/2024) 90 capsule 3     No current facility-administered medications for this visit.  "     Allergies:     No Known Allergies   Physical Exam:     /70   Pulse 69   Temp 97.5 °F (36.4 °C) (Temporal)   Resp 16   Ht 5' 10.75\" (1.797 m)   Wt 81.2 kg (179 lb)   SpO2 97%   BMI 25.14 kg/m²     Physical Exam  Constitutional:       Appearance: Normal appearance. He is well-developed and normal weight.   HENT:      Head: Normocephalic and atraumatic.      Right Ear: External ear normal.      Left Ear: External ear normal.   Eyes:      Extraocular Movements: Extraocular movements intact.      Conjunctiva/sclera: Conjunctivae normal.      Pupils: Pupils are equal, round, and reactive to light.   Neck:      Thyroid: No thyromegaly.   Cardiovascular:      Rate and Rhythm: Normal rate and regular rhythm.      Pulses: Normal pulses.      Heart sounds: Normal heart sounds. No murmur heard.  Pulmonary:      Effort: Pulmonary effort is normal.      Breath sounds: Normal breath sounds. No wheezing or rales.   Abdominal:      General: Abdomen is flat. Bowel sounds are normal.      Palpations: Abdomen is soft. There is no mass.      Tenderness: There is no abdominal tenderness. There is no rebound.   Musculoskeletal:         General: Normal range of motion.      Cervical back: Normal range of motion and neck supple.   Lymphadenopathy:      Cervical: No cervical adenopathy.   Skin:     General: Skin is warm.   Neurological:      General: No focal deficit present.      Mental Status: He is alert and oriented to person, place, and time.      Cranial Nerves: No cranial nerve deficit.      Deep Tendon Reflexes: Reflexes normal.   Psychiatric:         Mood and Affect: Mood normal.         Behavior: Behavior normal.         Thought Content: Thought content normal.         Judgment: Judgment normal.        Ashli Jackson PA-C  [facility]    "

## 2024-02-23 ENCOUNTER — APPOINTMENT (OUTPATIENT)
Dept: LAB | Facility: CLINIC | Age: 54
End: 2024-02-23
Payer: COMMERCIAL

## 2024-02-23 DIAGNOSIS — Z00.00 ANNUAL PHYSICAL EXAM: ICD-10-CM

## 2024-02-23 DIAGNOSIS — Z12.5 PROSTATE CANCER SCREENING: ICD-10-CM

## 2024-02-23 LAB
ALBUMIN SERPL BCP-MCNC: 4.3 G/DL (ref 3.5–5)
ALP SERPL-CCNC: 37 U/L (ref 34–104)
ALT SERPL W P-5'-P-CCNC: 13 U/L (ref 7–52)
ANION GAP SERPL CALCULATED.3IONS-SCNC: 9 MMOL/L
AST SERPL W P-5'-P-CCNC: 17 U/L (ref 13–39)
BACTERIA UR QL AUTO: ABNORMAL /HPF
BASOPHILS # BLD AUTO: 0.03 THOUSANDS/ÂΜL (ref 0–0.1)
BASOPHILS NFR BLD AUTO: 0 % (ref 0–1)
BILIRUB SERPL-MCNC: 2.3 MG/DL (ref 0.2–1)
BILIRUB UR QL STRIP: NEGATIVE
BUN SERPL-MCNC: 13 MG/DL (ref 5–25)
CALCIUM SERPL-MCNC: 8.9 MG/DL (ref 8.4–10.2)
CHLORIDE SERPL-SCNC: 102 MMOL/L (ref 96–108)
CHOLEST SERPL-MCNC: 150 MG/DL
CLARITY UR: CLEAR
CO2 SERPL-SCNC: 28 MMOL/L (ref 21–32)
COLOR UR: ABNORMAL
CREAT SERPL-MCNC: 1.13 MG/DL (ref 0.6–1.3)
EOSINOPHIL # BLD AUTO: 0.1 THOUSAND/ÂΜL (ref 0–0.61)
EOSINOPHIL NFR BLD AUTO: 1 % (ref 0–6)
ERYTHROCYTE [DISTWIDTH] IN BLOOD BY AUTOMATED COUNT: 12.6 % (ref 11.6–15.1)
GFR SERPL CREATININE-BSD FRML MDRD: 73 ML/MIN/1.73SQ M
GLUCOSE P FAST SERPL-MCNC: 87 MG/DL (ref 65–99)
GLUCOSE UR STRIP-MCNC: NEGATIVE MG/DL
HCT VFR BLD AUTO: 45.7 % (ref 36.5–49.3)
HDLC SERPL-MCNC: 67 MG/DL
HGB BLD-MCNC: 16.6 G/DL (ref 12–17)
HGB UR QL STRIP.AUTO: ABNORMAL
IMM GRANULOCYTES # BLD AUTO: 0.02 THOUSAND/UL (ref 0–0.2)
IMM GRANULOCYTES NFR BLD AUTO: 0 % (ref 0–2)
KETONES UR STRIP-MCNC: NEGATIVE MG/DL
LDLC SERPL CALC-MCNC: 75 MG/DL (ref 0–100)
LEUKOCYTE ESTERASE UR QL STRIP: NEGATIVE
LYMPHOCYTES # BLD AUTO: 1.19 THOUSANDS/ÂΜL (ref 0.6–4.47)
LYMPHOCYTES NFR BLD AUTO: 16 % (ref 14–44)
MCH RBC QN AUTO: 33.9 PG (ref 26.8–34.3)
MCHC RBC AUTO-ENTMCNC: 36.3 G/DL (ref 31.4–37.4)
MCV RBC AUTO: 93 FL (ref 82–98)
MONOCYTES # BLD AUTO: 0.85 THOUSAND/ÂΜL (ref 0.17–1.22)
MONOCYTES NFR BLD AUTO: 12 % (ref 4–12)
MUCOUS THREADS UR QL AUTO: ABNORMAL
NEUTROPHILS # BLD AUTO: 5.2 THOUSANDS/ÂΜL (ref 1.85–7.62)
NEUTS SEG NFR BLD AUTO: 71 % (ref 43–75)
NITRITE UR QL STRIP: NEGATIVE
NON-SQ EPI CELLS URNS QL MICRO: ABNORMAL /HPF
NRBC BLD AUTO-RTO: 0 /100 WBCS
PH UR STRIP.AUTO: 6.5 [PH]
PLATELET # BLD AUTO: 266 THOUSANDS/UL (ref 149–390)
PMV BLD AUTO: 9.2 FL (ref 8.9–12.7)
POTASSIUM SERPL-SCNC: 4 MMOL/L (ref 3.5–5.3)
PROT SERPL-MCNC: 6.5 G/DL (ref 6.4–8.4)
PROT UR STRIP-MCNC: NEGATIVE MG/DL
PSA SERPL-MCNC: 0.72 NG/ML (ref 0–4)
RBC # BLD AUTO: 4.9 MILLION/UL (ref 3.88–5.62)
RBC #/AREA URNS AUTO: ABNORMAL /HPF
SODIUM SERPL-SCNC: 139 MMOL/L (ref 135–147)
SP GR UR STRIP.AUTO: 1.01 (ref 1–1.03)
TRIGL SERPL-MCNC: 40 MG/DL
TSH SERPL DL<=0.05 MIU/L-ACNC: 0.94 UIU/ML (ref 0.45–4.5)
UROBILINOGEN UR STRIP-ACNC: <2 MG/DL
WBC # BLD AUTO: 7.39 THOUSAND/UL (ref 4.31–10.16)
WBC #/AREA URNS AUTO: ABNORMAL /HPF

## 2024-02-23 PROCEDURE — 84443 ASSAY THYROID STIM HORMONE: CPT

## 2024-02-23 PROCEDURE — 80053 COMPREHEN METABOLIC PANEL: CPT

## 2024-02-23 PROCEDURE — 36415 COLL VENOUS BLD VENIPUNCTURE: CPT

## 2024-02-23 PROCEDURE — 81001 URINALYSIS AUTO W/SCOPE: CPT

## 2024-02-23 PROCEDURE — G0103 PSA SCREENING: HCPCS

## 2024-02-23 PROCEDURE — 80061 LIPID PANEL: CPT

## 2024-02-23 PROCEDURE — 85025 COMPLETE CBC W/AUTO DIFF WBC: CPT

## 2024-02-26 ENCOUNTER — APPOINTMENT (OUTPATIENT)
Dept: LAB | Facility: CLINIC | Age: 54
End: 2024-02-26
Payer: COMMERCIAL

## 2024-02-26 DIAGNOSIS — C62.90 MALIGNANT NEOPLASM OF TESTICLE, UNSPECIFIED LATERALITY, UNSPECIFIED WHETHER DESCENDED OR UNDESCENDED (HCC): ICD-10-CM

## 2024-02-26 DIAGNOSIS — Z85.47 HISTORY OF TESTICULAR CANCER: ICD-10-CM

## 2024-02-26 LAB — TESTOST SERPL-MSCNC: 1107 NG/DL

## 2024-02-26 PROCEDURE — 84403 ASSAY OF TOTAL TESTOSTERONE: CPT

## 2024-02-26 PROCEDURE — 36415 COLL VENOUS BLD VENIPUNCTURE: CPT

## 2024-07-03 ENCOUNTER — HOSPITAL ENCOUNTER (EMERGENCY)
Facility: HOSPITAL | Age: 54
Discharge: HOME | End: 2024-07-03
Attending: EMERGENCY MEDICINE
Payer: COMMERCIAL

## 2024-07-03 ENCOUNTER — APPOINTMENT (EMERGENCY)
Dept: CT IMAGING | Facility: HOSPITAL | Age: 54
End: 2024-07-03
Payer: COMMERCIAL

## 2024-07-03 ENCOUNTER — APPOINTMENT (OUTPATIENT)
Dept: RADIOLOGY | Facility: HOSPITAL | Age: 54
End: 2024-07-03
Payer: COMMERCIAL

## 2024-07-03 VITALS
SYSTOLIC BLOOD PRESSURE: 143 MMHG | RESPIRATION RATE: 18 BRPM | TEMPERATURE: 97.9 F | HEART RATE: 74 BPM | DIASTOLIC BLOOD PRESSURE: 82 MMHG | OXYGEN SATURATION: 96 %

## 2024-07-03 DIAGNOSIS — R07.9 CHEST PAIN: Primary | ICD-10-CM

## 2024-07-03 DIAGNOSIS — K21.9 GERD (GASTROESOPHAGEAL REFLUX DISEASE): ICD-10-CM

## 2024-07-03 DIAGNOSIS — K57.90 DIVERTICULOSIS: ICD-10-CM

## 2024-07-03 DIAGNOSIS — R91.1 PULMONARY NODULE: ICD-10-CM

## 2024-07-03 LAB
2HR DELTA HS TROPONIN: 0 NG/L
ALBUMIN SERPL BCG-MCNC: 4.2 G/DL (ref 3.5–5)
ALP SERPL-CCNC: 38 U/L (ref 34–104)
ALT SERPL W P-5'-P-CCNC: 13 U/L (ref 7–52)
ANION GAP SERPL CALCULATED.3IONS-SCNC: 6 MMOL/L (ref 4–13)
AST SERPL W P-5'-P-CCNC: 18 U/L (ref 13–39)
ATRIAL RATE: 74 BPM
BASOPHILS # BLD AUTO: 0.03 THOUSANDS/ÂΜL (ref 0–0.1)
BASOPHILS NFR BLD AUTO: 0 % (ref 0–1)
BILIRUB SERPL-MCNC: 1.61 MG/DL (ref 0.2–1)
BUN SERPL-MCNC: 26 MG/DL (ref 5–25)
CALCIUM SERPL-MCNC: 9.1 MG/DL (ref 8.4–10.2)
CARDIAC TROPONIN I PNL SERPL HS: 7 NG/L
CARDIAC TROPONIN I PNL SERPL HS: 7 NG/L
CHLORIDE SERPL-SCNC: 104 MMOL/L (ref 96–108)
CO2 SERPL-SCNC: 26 MMOL/L (ref 21–32)
CREAT SERPL-MCNC: 1.22 MG/DL (ref 0.6–1.3)
EOSINOPHIL # BLD AUTO: 0.32 THOUSAND/ÂΜL (ref 0–0.61)
EOSINOPHIL NFR BLD AUTO: 3 % (ref 0–6)
ERYTHROCYTE [DISTWIDTH] IN BLOOD BY AUTOMATED COUNT: 12.2 % (ref 11.6–15.1)
GFR SERPL CREATININE-BSD FRML MDRD: 66 ML/MIN/1.73SQ M
GLUCOSE SERPL-MCNC: 97 MG/DL (ref 65–140)
HCT VFR BLD AUTO: 45.6 % (ref 36.5–49.3)
HGB BLD-MCNC: 15.8 G/DL (ref 12–17)
IMM GRANULOCYTES # BLD AUTO: 0.03 THOUSAND/UL (ref 0–0.2)
IMM GRANULOCYTES NFR BLD AUTO: 0 % (ref 0–2)
LYMPHOCYTES # BLD AUTO: 1.22 THOUSANDS/ÂΜL (ref 0.6–4.47)
LYMPHOCYTES NFR BLD AUTO: 13 % (ref 14–44)
MCH RBC QN AUTO: 33.1 PG (ref 26.8–34.3)
MCHC RBC AUTO-ENTMCNC: 34.6 G/DL (ref 31.4–37.4)
MCV RBC AUTO: 96 FL (ref 82–98)
MONOCYTES # BLD AUTO: 0.76 THOUSAND/ÂΜL (ref 0.17–1.22)
MONOCYTES NFR BLD AUTO: 8 % (ref 4–12)
NEUTROPHILS # BLD AUTO: 7.31 THOUSANDS/ÂΜL (ref 1.85–7.62)
NEUTS SEG NFR BLD AUTO: 76 % (ref 43–75)
NRBC BLD AUTO-RTO: 0 /100 WBCS
P AXIS: 72 DEGREES
PLATELET # BLD AUTO: 222 THOUSANDS/UL (ref 149–390)
PMV BLD AUTO: 9.3 FL (ref 8.9–12.7)
POTASSIUM SERPL-SCNC: 4 MMOL/L (ref 3.5–5.3)
PR INTERVAL: 168 MS
PROT SERPL-MCNC: 6.9 G/DL (ref 6.4–8.4)
QRS AXIS: 77 DEGREES
QRSD INTERVAL: 94 MS
QT INTERVAL: 360 MS
QTC INTERVAL: 399 MS
RBC # BLD AUTO: 4.77 MILLION/UL (ref 3.88–5.62)
SODIUM SERPL-SCNC: 136 MMOL/L (ref 135–147)
T WAVE AXIS: 70 DEGREES
VENTRICULAR RATE: 74 BPM
WBC # BLD AUTO: 9.67 THOUSAND/UL (ref 4.31–10.16)

## 2024-07-03 PROCEDURE — 93010 ELECTROCARDIOGRAM REPORT: CPT | Performed by: INTERNAL MEDICINE

## 2024-07-03 PROCEDURE — 85025 COMPLETE CBC W/AUTO DIFF WBC: CPT | Performed by: EMERGENCY MEDICINE

## 2024-07-03 PROCEDURE — 84484 ASSAY OF TROPONIN QUANT: CPT | Performed by: EMERGENCY MEDICINE

## 2024-07-03 PROCEDURE — 71275 CT ANGIOGRAPHY CHEST: CPT

## 2024-07-03 PROCEDURE — 80053 COMPREHEN METABOLIC PANEL: CPT | Performed by: EMERGENCY MEDICINE

## 2024-07-03 PROCEDURE — 99285 EMERGENCY DEPT VISIT HI MDM: CPT | Performed by: EMERGENCY MEDICINE

## 2024-07-03 PROCEDURE — 93005 ELECTROCARDIOGRAM TRACING: CPT

## 2024-07-03 PROCEDURE — 74174 CTA ABD&PLVS W/CONTRAST: CPT

## 2024-07-03 PROCEDURE — 71046 X-RAY EXAM CHEST 2 VIEWS: CPT

## 2024-07-03 PROCEDURE — 36415 COLL VENOUS BLD VENIPUNCTURE: CPT

## 2024-07-03 RX ORDER — PANTOPRAZOLE SODIUM 40 MG/1
40 TABLET, DELAYED RELEASE ORAL DAILY
Qty: 14 TABLET | Refills: 0 | Status: SHIPPED | OUTPATIENT
Start: 2024-07-03 | End: 2024-07-17

## 2024-07-03 RX ORDER — PANTOPRAZOLE SODIUM 40 MG/10ML
40 INJECTION, POWDER, LYOPHILIZED, FOR SOLUTION INTRAVENOUS ONCE
Status: COMPLETED | OUTPATIENT
Start: 2024-07-03 | End: 2024-07-03

## 2024-07-03 RX ADMIN — IOHEXOL 100 ML: 350 INJECTION, SOLUTION INTRAVENOUS at 20:42

## 2024-07-03 RX ADMIN — PANTOPRAZOLE SODIUM 40 MG: 40 INJECTION, POWDER, FOR SOLUTION INTRAVENOUS at 20:17

## 2024-07-03 NOTE — ED PROVIDER NOTES
"History  Chief Complaint   Patient presents with    Chest Pain     Pt reports mid back pain \"for a while\". Has had sternal pain \"all day\". About 2 hours PTA he felt like he had to burp, but couldn't make himself. Hx of GERD. Went to  and was sent to the er. Denies sob.      54 year old male presents for evaluation of central chest pressure radiating to his mid back worsening at 5 pm today while at a restaurant.  Pain was associated with diaphoresis and lightheadedness.  Patient did not lose consciousness.  No associated nausea, vomiting or shortness of breath.  Patient states he has been having mid back pain radiating to his chest for the past month, but thought it was either musculoskeletal or GERD related.  He has been going to his chiropractor for adjustments and has transient relief with adjustment of the upper back; however, the pain returns afterwards.  Patient denies any recent travel.  He has occasional cramping in both calves which he attributes to running.  No swelling in the legs.  Remote history of testicular cancer in 2000.        Chest Pain      Prior to Admission Medications   Prescriptions Last Dose Informant Patient Reported? Taking?   Multiple Vitamin (ONE-A-DAY MENS PO)  Self Yes No   Sig: Take by mouth   famotidine (PEPCID) 20 mg tablet   No No   Sig: Take 1 tablet (20 mg total) by mouth daily at bedtime   Patient not taking: Reported on 2/22/2024      Facility-Administered Medications: None       Past Medical History:   Diagnosis Date    GERD (gastroesophageal reflux disease)     Renal cyst     Testicular cancer (HCC)     Left    Vertigo        Past Surgical History:   Procedure Laterality Date    DENTAL SURGERY      Graff teeth extraction    TESTICLE SURGERY      Left       Family History   Problem Relation Age of Onset    Endometriosis Mother     Cervical cancer Mother     Osteoporosis Mother     Testicular cancer Father     Lymphoma Father     Alcohol abuse Sister     Osteoporosis Sister  "    Diabetes Maternal Grandmother     Liver cancer Maternal Uncle     Cancer Maternal Uncle 76        colon    Colon cancer Maternal Uncle     Liver cancer Paternal Uncle     Cancer Paternal Uncle 68        colon    Colon cancer Paternal Uncle     Colon cancer Maternal Grandfather     Substance Abuse Neg Hx     Mental illness Neg Hx      I have reviewed and agree with the history as documented.    E-Cigarette/Vaping    E-Cigarette Use Never User      E-Cigarette/Vaping Substances    Nicotine No     Flavoring No      Social History     Tobacco Use    Smoking status: Never    Smokeless tobacco: Never   Vaping Use    Vaping status: Never Used   Substance Use Topics    Alcohol use: Yes     Comment: social    Drug use: Never       Review of Systems   Cardiovascular:  Positive for chest pain.       Physical Exam  Physical Exam  Vitals and nursing note reviewed.   HENT:      Head: Normocephalic and atraumatic.   Cardiovascular:      Rate and Rhythm: Normal rate and regular rhythm.      Pulses: Normal pulses.      Heart sounds: Normal heart sounds.   Pulmonary:      Effort: Pulmonary effort is normal. No respiratory distress.      Breath sounds: Normal breath sounds.   Abdominal:      General: There is no distension.      Palpations: Abdomen is soft.      Tenderness: There is no abdominal tenderness.   Musculoskeletal:      Right lower leg: No edema.      Left lower leg: No edema.   Skin:     General: Skin is warm and dry.   Neurological:      Mental Status: He is alert.         Vital Signs  ED Triage Vitals   Temperature Pulse Respirations Blood Pressure SpO2   07/03/24 1838 07/03/24 1840 07/03/24 1840 07/03/24 1840 07/03/24 1840   97.9 °F (36.6 °C) 74 18 143/82 96 %      Temp Source Heart Rate Source Patient Position - Orthostatic VS BP Location FiO2 (%)   07/03/24 1838 07/03/24 1840 07/03/24 1840 07/03/24 1840 --   Temporal Monitor Sitting Right arm       Pain Score       07/03/24 1957       8           Vitals:     07/03/24 1840   BP: 143/82   Pulse: 74   Patient Position - Orthostatic VS: Sitting         Visual Acuity      ED Medications  Medications   pantoprazole (PROTONIX) injection 40 mg (40 mg Intravenous Given 7/3/24 2017)   iohexol (OMNIPAQUE) 350 MG/ML injection (SINGLE-DOSE) 100 mL (100 mL Intravenous Given 7/3/24 2042)       Diagnostic Studies  Results Reviewed       Procedure Component Value Units Date/Time    HS Troponin I 2hr [[id number]]  (Normal) Collected: 07/03/24 2108    Lab Status: Final result Specimen: Blood from Arm, Left Updated: 07/03/24 2134     hs TnI 2hr 7 ng/L      Delta 2hr hsTnI 0 ng/L     HS Troponin 0hr (reflex protocol) [[id number]]  (Normal) Collected: 07/03/24 1844    Lab Status: Final result Specimen: Blood from Arm, Left Updated: 07/03/24 1912     hs TnI 0hr 7 ng/L     Comprehensive metabolic panel [[id number]]  (Abnormal) Collected: 07/03/24 1844    Lab Status: Final result Specimen: Blood from Arm, Left Updated: 07/03/24 1906     Sodium 136 mmol/L      Potassium 4.0 mmol/L      Chloride 104 mmol/L      CO2 26 mmol/L      ANION GAP 6 mmol/L      BUN 26 mg/dL      Creatinine 1.22 mg/dL      Glucose 97 mg/dL      Calcium 9.1 mg/dL      AST 18 U/L      ALT 13 U/L      Alkaline Phosphatase 38 U/L      Total Protein 6.9 g/dL      Albumin 4.2 g/dL      Total Bilirubin 1.61 mg/dL      eGFR 66 ml/min/1.73sq m     Narrative:      National Kidney Disease Foundation guidelines for Chronic Kidney Disease (CKD):     Stage 1 with normal or high GFR (GFR > 90 mL/min/1.73 square meters)    Stage 2 Mild CKD (GFR = 60-89 mL/min/1.73 square meters)    Stage 3A Moderate CKD (GFR = 45-59 mL/min/1.73 square meters)    Stage 3B Moderate CKD (GFR = 30-44 mL/min/1.73 square meters)    Stage 4 Severe CKD (GFR = 15-29 mL/min/1.73 square meters)    Stage 5 End Stage CKD (GFR <15 mL/min/1.73 square meters)  Note: GFR calculation is accurate only with a steady state creatinine    CBC and differential [[id number]]   (Abnormal) Collected: 07/03/24 1844    Lab Status: Final result Specimen: Blood from Arm, Left Updated: 07/03/24 1848     WBC 9.67 Thousand/uL      RBC 4.77 Million/uL      Hemoglobin 15.8 g/dL      Hematocrit 45.6 %      MCV 96 fL      MCH 33.1 pg      MCHC 34.6 g/dL      RDW 12.2 %      MPV 9.3 fL      Platelets 222 Thousands/uL      nRBC 0 /100 WBCs      Segmented % 76 %      Immature Grans % 0 %      Lymphocytes % 13 %      Monocytes % 8 %      Eosinophils Relative 3 %      Basophils Relative 0 %      Absolute Neutrophils 7.31 Thousands/µL      Absolute Immature Grans 0.03 Thousand/uL      Absolute Lymphocytes 1.22 Thousands/µL      Absolute Monocytes 0.76 Thousand/µL      Eosinophils Absolute 0.32 Thousand/µL      Basophils Absolute 0.03 Thousands/µL                    CTA dissection protocol chest/abdomen/pelvis   Final Result by Joe Ochoa MD (07/03 2124)      No aortic dissection or aneurysm.      3 mm right upper lobe nodule. No prior studies based on current Fleischner Society 2017 Guidelines on incidental pulmonary nodule, no routine follow-up is needed if the patient is low risk. If the patient is high risk, optional follow-up chest CT at 12    months can be considered.      No acute inflammatory process identified in the abdomen or pelvis.      Colonic diverticulosis without diverticulitis.                  Workstation performed: [id number]         XR chest 2 views   ED Interpretation by Zayda Dodd MD (07/03 1940)   No acute pulmonary pathology                 Procedures  ECG 12 Lead Documentation Only    Date/Time: 7/3/2024 6:42 PM    Performed by: Zayda Dodd MD  Authorized by: Zayda Dodd MD    Indications / Diagnosis:  Chest pain  ECG reviewed by me, the ED Provider: yes    Patient location:  ED  Previous ECG:     Previous ECG:  Unavailable  Interpretation:     Interpretation: abnormal    Rate:     ECG rate:  74    ECG rate assessment: normal    Rhythm:      Rhythm: sinus rhythm    Ectopy:     Ectopy: none    QRS:     QRS axis:  Normal    QRS intervals:  Normal  Conduction:     Conduction: abnormal      Abnormal conduction: incomplete RBBB    ST segments:     ST segments:  Normal  T waves:     T waves: inverted      Inverted:  AVL and V2           ED Course             HEART Risk Score      Flowsheet Row Most Recent Value   Heart Score Risk Calculator    History 0 Filed at: 07/03/2024 1958   ECG 1 Filed at: 07/03/2024 1958   Age 1 Filed at: 07/03/2024 1958   Risk Factors 0 Filed at: 07/03/2024 1958   Troponin 0 Filed at: 07/03/2024 1958   HEART Score 2 Filed at: 07/03/2024 1958                          SBIRT 22yo+      Flowsheet Row Most Recent Value   Initial Alcohol Screen: US AUDIT-C     1. How often do you have a drink containing alcohol? 0 Filed at: 07/03/2024 1840   2. How many drinks containing alcohol do you have on a typical day you are drinking?  0 Filed at: 07/03/2024 1840   3a. Male UNDER 65: How often do you have five or more drinks on one occasion? 0 Filed at: 07/03/2024 1840   3b. FEMALE Any Age, or MALE 65+: How often do you have 4 or more drinks on one occassion? 0 Filed at: 07/03/2024 1840   Audit-C Score 0 Filed at: 07/03/2024 1840   FERNANDO: How many times in the past year have you...    Used an illegal drug or used a prescription medication for non-medical reasons? Never Filed at: 07/03/2024 1840                      Medical Decision Making  54 year old male presents for evaluation of chest pain radiating to his back for the past month, worsening since 5 pm today with associated diaphoresis and lightheadedness.  EKG without STEMI criteria or arrhythmia on my independent interpretation.  HEART score 2.  CTA dissection study without signs of aortic pathology or central pulmonary embolism.  Possible GERD.  Some improvement with protonix.  GI follow up.  Discussed return precautions with patient.    Amount and/or Complexity of Data Reviewed  Labs:  ordered.  Radiology: ordered and independent interpretation performed.    Risk  Prescription drug management.             Disposition  Final diagnoses:   Chest pain   Pulmonary nodule   Diverticulosis   GERD (gastroesophageal reflux disease)     Time reflects when diagnosis was documented in both MDM as applicable and the Disposition within this note       Time User Action Codes Description Comment    7/3/2024  9:30 PM Yousif, Zayda J Add [R07.9] Chest pain     7/3/2024  9:30 PM Yousif, Zayda J Add [R91.1] Pulmonary nodule     7/3/2024  9:30 PM Yousif, Zayda J Add [K57.90] Diverticulosis     7/3/2024  9:39 PM Yousif, Zayda J Add [K21.9] GERD (gastroesophageal reflux disease)     7/3/2024  9:39 PM Yousif, Zayda J Modify [R07.9] Chest pain     7/3/2024  9:39 PM Yousif, Zayda J Modify [R91.1] Pulmonary nodule     7/3/2024  9:39 PM Yousif, Zayda J Modify [K57.90] Diverticulosis     7/3/2024  9:39 PM Yousif, Zayda J Modify [K21.9] GERD (gastroesophageal reflux disease)     7/3/2024  9:41 PM Yousif, Zayda J Modify [R07.9] Chest pain     7/3/2024  9:41 PM Yousif, Zayda J Modify [K21.9] GERD (gastroesophageal reflux disease)           ED Disposition       ED Disposition   Discharge    Condition   Stable    Date/Time   Wed Jul 3, 2024 2141    Comment   Juanjo Wilkerson discharge to home/self care.                   Follow-up Information       Follow up With Specialties Details Why Contact Info Additional Information    Ashli Jackson PA-C Family Medicine, Physician Assistant Schedule an appointment as soon as possible for a visit in 1 week for monitoring of pulmonary nodule [address] [place], [address]  [facility] [id number] [phone]       [facility] [place] [facility] Schedule an appointment as soon as possible for a visit in 1 week for further evaluation of suspected GERD [address]  [facility]  [address]  [phone] [facility] [place], [facility], [facility] [address], [facility]  [address]     [phone]     [facility] Emergency Department Emergency Medicine Go to  If symptoms worsen [address] [address] [phone] [facility] Emergency Department, [address] [place], [place] [address]            Patient's Medications   Discharge Prescriptions    PANTOPRAZOLE (PROTONIX) 40 MG TABLET    Take 1 tablet (40 mg total) by mouth daily for 14 days       Start Date: 7/3/2024  End Date: 7/17/2024       Order Dose: 40 mg       Quantity: 14 tablet    Refills: 0           PDMP Review         Value Time User    PDMP Reviewed  Yes 8/9/2021  9:49 AM Shelton Mitchell PA-C            ED Provider  Electronically Signed by             Zayda Dodd MD  07/03/24 2143

## 2024-07-04 NOTE — INCIDENTAL FINDINGS
The following findings require follow up:  Radiographic finding   Finding: pulmonary nodule and diverticulosis   Follow up required: with PCP   Follow up should be done within 1 week(s)    Please notify the following clinician to assist with the follow up:   Ashli Jackson PA-C    Incidental finding results were discussed with the Patient by Zayda Dodd MD on 07/03/24.   They expressed understanding and all questions answered.

## 2024-08-21 ENCOUNTER — OFFICE VISIT (OUTPATIENT)
Dept: GASTROENTEROLOGY | Facility: CLINIC | Age: 54
End: 2024-08-21
Payer: COMMERCIAL

## 2024-08-21 VITALS
TEMPERATURE: 97.7 F | WEIGHT: 179 LBS | HEIGHT: 70 IN | DIASTOLIC BLOOD PRESSURE: 70 MMHG | BODY MASS INDEX: 25.62 KG/M2 | SYSTOLIC BLOOD PRESSURE: 130 MMHG

## 2024-08-21 DIAGNOSIS — K21.9 GERD (GASTROESOPHAGEAL REFLUX DISEASE): ICD-10-CM

## 2024-08-21 DIAGNOSIS — R17 ELEVATED BILIRUBIN: Primary | ICD-10-CM

## 2024-08-21 DIAGNOSIS — Z12.11 SCREENING FOR COLON CANCER: ICD-10-CM

## 2024-08-21 PROCEDURE — 99204 OFFICE O/P NEW MOD 45 MIN: CPT | Performed by: PHYSICIAN ASSISTANT

## 2024-08-21 RX ORDER — FAMOTIDINE 40 MG/1
40 TABLET, FILM COATED ORAL
Qty: 60 TABLET | Refills: 2 | Status: SHIPPED | OUTPATIENT
Start: 2024-08-21

## 2024-08-21 RX ORDER — OMEPRAZOLE 40 MG/1
40 CAPSULE, DELAYED RELEASE ORAL DAILY
Qty: 30 CAPSULE | Refills: 2 | Status: SHIPPED | OUTPATIENT
Start: 2024-08-21

## 2024-08-21 NOTE — PROGRESS NOTES
"[facility] - Outpatient Consultation  Juanjo Wilkerson 54 y.o. male MRN: [id number]  Encounter: [id number]          ASSESSMENT AND PLAN:      Juanjo is a 53 y/o male who presents for consultation of GERD.     GERD  Pt presented to the ED in July for chest pain with negative cardiac work-up. He says for many years, he has had heartburn that occurs at least 5 days/week. He says the \"worst\" is at night as there are nights when he wakes up with reflux and belching. He says being on protonix did help, but did not \"get rid\" of it.   -start omeprazole 40 mg daily   -start pepcid 40 mg QHS PRN breakthrough heartburn  -EGD ordered   -anti-gerd diet discussed and handout given    2. Hyperbilirubinemia  Pt with chronically elevated T.bili, though very mild. T.bili at 1.6 in the ED.   -fractionate bilirubin     3. Colon cancer screening  Colonoscopy 2021 WNL  -repeat screening colonoscopy 2031   ______________________________________________________________________    HPI:  Juanjo is a 53 y/o male who presents for consultation of GERD. He says for many years, he has had heartburn that occurs at least 5 days/week. He says the \"worst\" is at night as there are nights when he wakes up with reflux and belching. He says being on protonix did help, but did not \"get rid\" of it. He denies n/v, trouble swallowing, abdominal pain, unintentional weight loss, fevers, chills, night sweats, constipation, diarrhea, NSAID use, bloody or black BM. Pt says that he has family on his mother's side, including his grandfather and an uncle, with colon cancer and an uncle on his father's side with colon cancer,  but denies any first-degree family history of this. Pt is not on blood thinners. Pt denies tobacco use and says he drinks alcohol socially.       REVIEW OF SYSTEMS:    CONSTITUTIONAL: Denies any fever, chills, rigors, and weight loss.  HEENT: No earache or tinnitus. Denies hearing loss or visual " disturbances.  CARDIOVASCULAR: No chest pain or palpitations.   RESPIRATORY: Denies any cough, hemoptysis, shortness of breath or dyspnea on exertion.  GASTROINTESTINAL: As noted in the History of Present Illness.   GENITOURINARY: No problems with urination. Denies any hematuria or dysuria.  NEUROLOGIC: No dizziness or vertigo, denies headaches.   MUSCULOSKELETAL: Denies any muscle or joint pain.   SKIN: Denies skin rashes or itching.   ENDOCRINE: Denies excessive thirst. Denies intolerance to heat or cold.  PSYCHOSOCIAL: Denies depression or anxiety. Denies any recent memory loss.       Historical Information   Past Medical History:   Diagnosis Date    GERD (gastroesophageal reflux disease)     Renal cyst     Testicular cancer (HCC)     Left    Vertigo      Past Surgical History:   Procedure Laterality Date    DENTAL SURGERY      [place] teeth extraction    TESTICLE SURGERY      Left     Social History   Social History     Substance and Sexual Activity   Alcohol Use Yes    Comment: social     Social History     Substance and Sexual Activity   Drug Use Never     Social History     Tobacco Use   Smoking Status Never   Smokeless Tobacco Never     Family History   Problem Relation Age of Onset    Endometriosis Mother     Cervical cancer Mother     Osteoporosis Mother     Testicular cancer Father     Lymphoma Father     Alcohol abuse Sister     Osteoporosis Sister     Diabetes Maternal Grandmother     Liver cancer Maternal Uncle     Cancer Maternal Uncle 76        colon    Colon cancer Maternal Uncle     Liver cancer Paternal Uncle     Cancer Paternal Uncle 68        colon    Colon cancer Paternal Uncle     Colon cancer Maternal Grandfather     Substance Abuse Neg Hx     Mental illness Neg Hx        Meds/Allergies       Current Outpatient Medications:     famotidine (PEPCID) 20 mg tablet    Multiple Vitamin (ONE-A-DAY MENS PO)    pantoprazole (PROTONIX) 40 mg tablet    No Known Allergies        Objective     There were  no vitals taken for this visit. There is no height or weight on file to calculate BMI.        PHYSICAL EXAM:      General Appearance:   Alert, cooperative, no distress   HEENT:   Normocephalic, atraumatic, anicteric.     Neck:  Supple, symmetrical, trachea midline   Lungs:   Clear to auscultation bilaterally; no rales, rhonchi or wheezing; respirations unlabored    Heart::   Regular rate and rhythm; no murmur, rub, or gallop.   Abdomen:   Soft, non-tender, non-distended; normal bowel sounds; no masses, no organomegaly    Genitalia:   Deferred    Rectal:   Deferred    Extremities:  No cyanosis, clubbing or edema    Pulses:  2+ and symmetric    Skin:  No jaundice, rashes, or lesions    Lymph nodes:  No palpable cervical lymphadenopathy        Lab Results:   No visits with results within 1 Day(s) from this visit.   Latest known visit with results is:   Admission on 07/03/2024, Discharged on 07/03/2024   Component Date Value    Ventricular Rate 07/03/2024 74     Atrial Rate 07/03/2024 74     NJ Interval 07/03/2024 168     QRSD Interval 07/03/2024 94     QT Interval 07/03/2024 360     QTC Interval 07/03/2024 399     P Axis 07/03/2024 72     QRS Axis 07/03/2024 77     T Wave Majestic 07/03/2024 70     WBC 07/03/2024 9.67     RBC 07/03/2024 4.77     Hemoglobin 07/03/2024 15.8     Hematocrit 07/03/2024 45.6     MCV 07/03/2024 96     MCH 07/03/2024 33.1     MCHC 07/03/2024 34.6     RDW 07/03/2024 12.2     MPV 07/03/2024 9.3     Platelets 07/03/2024 222     nRBC 07/03/2024 0     Segmented % 07/03/2024 76 (H)     Immature Grans % 07/03/2024 0     Lymphocytes % 07/03/2024 13 (L)     Monocytes % 07/03/2024 8     Eosinophils Relative 07/03/2024 3     Basophils Relative 07/03/2024 0     Absolute Neutrophils 07/03/2024 7.31     Absolute Immature Grans 07/03/2024 0.03     Absolute Lymphocytes 07/03/2024 1.22     Absolute Monocytes 07/03/2024 0.76     Eosinophils Absolute 07/03/2024 0.32     Basophils Absolute 07/03/2024 0.03     Sodium  07/03/2024 136     Potassium 07/03/2024 4.0     Chloride 07/03/2024 104     CO2 07/03/2024 26     ANION GAP 07/03/2024 6     BUN 07/03/2024 26 (H)     Creatinine 07/03/2024 1.22     Glucose 07/03/2024 97     Calcium 07/03/2024 9.1     AST 07/03/2024 18     ALT 07/03/2024 13     Alkaline Phosphatase 07/03/2024 38     Total Protein 07/03/2024 6.9     Albumin 07/03/2024 4.2     Total Bilirubin 07/03/2024 1.61 (H)     eGFR 07/03/2024 66     hs TnI 0hr 07/03/2024 7     hs TnI 2hr 07/03/2024 7     Delta 2hr hsTnI 07/03/2024 0          Radiology Results:   No results found.

## 2024-08-21 NOTE — H&P (VIEW-ONLY)
"[facility] - Outpatient Consultation  Juanjo Wilkerson 54 y.o. male MRN: [id number]  Encounter: [id number]          ASSESSMENT AND PLAN:      Juanjo is a 55 y/o male who presents for consultation of GERD.     GERD  Pt presented to the ED in July for chest pain with negative cardiac work-up. He says for many years, he has had heartburn that occurs at least 5 days/week. He says the \"worst\" is at night as there are nights when he wakes up with reflux and belching. He says being on protonix did help, but did not \"get rid\" of it.   -start omeprazole 40 mg daily   -start pepcid 40 mg QHS PRN breakthrough heartburn  -EGD ordered   -anti-gerd diet discussed and handout given    2. Hyperbilirubinemia  Pt with chronically elevated T.bili, though very mild. T.bili at 1.6 in the ED.   -fractionate bilirubin     3. Colon cancer screening  Colonoscopy 2021 WNL  -repeat screening colonoscopy 2031   ______________________________________________________________________    HPI:  Juanjo is a 55 y/o male who presents for consultation of GERD. He says for many years, he has had heartburn that occurs at least 5 days/week. He says the \"worst\" is at night as there are nights when he wakes up with reflux and belching. He says being on protonix did help, but did not \"get rid\" of it. He denies n/v, trouble swallowing, abdominal pain, unintentional weight loss, fevers, chills, night sweats, constipation, diarrhea, NSAID use, bloody or black BM. Pt says that he has family on his mother's side, including his grandfather and an uncle, with colon cancer and an uncle on his father's side with colon cancer,  but denies any first-degree family history of this. Pt is not on blood thinners. Pt denies tobacco use and says he drinks alcohol socially.       REVIEW OF SYSTEMS:    CONSTITUTIONAL: Denies any fever, chills, rigors, and weight loss.  HEENT: No earache or tinnitus. Denies hearing loss or visual " disturbances.  CARDIOVASCULAR: No chest pain or palpitations.   RESPIRATORY: Denies any cough, hemoptysis, shortness of breath or dyspnea on exertion.  GASTROINTESTINAL: As noted in the History of Present Illness.   GENITOURINARY: No problems with urination. Denies any hematuria or dysuria.  NEUROLOGIC: No dizziness or vertigo, denies headaches.   MUSCULOSKELETAL: Denies any muscle or joint pain.   SKIN: Denies skin rashes or itching.   ENDOCRINE: Denies excessive thirst. Denies intolerance to heat or cold.  PSYCHOSOCIAL: Denies depression or anxiety. Denies any recent memory loss.       Historical Information   Past Medical History:   Diagnosis Date    GERD (gastroesophageal reflux disease)     Renal cyst     Testicular cancer (HCC)     Left    Vertigo      Past Surgical History:   Procedure Laterality Date    DENTAL SURGERY      [place] teeth extraction    TESTICLE SURGERY      Left     Social History   Social History     Substance and Sexual Activity   Alcohol Use Yes    Comment: social     Social History     Substance and Sexual Activity   Drug Use Never     Social History     Tobacco Use   Smoking Status Never   Smokeless Tobacco Never     Family History   Problem Relation Age of Onset    Endometriosis Mother     Cervical cancer Mother     Osteoporosis Mother     Testicular cancer Father     Lymphoma Father     Alcohol abuse Sister     Osteoporosis Sister     Diabetes Maternal Grandmother     Liver cancer Maternal Uncle     Cancer Maternal Uncle 76        colon    Colon cancer Maternal Uncle     Liver cancer Paternal Uncle     Cancer Paternal Uncle 68        colon    Colon cancer Paternal Uncle     Colon cancer Maternal Grandfather     Substance Abuse Neg Hx     Mental illness Neg Hx        Meds/Allergies       Current Outpatient Medications:     famotidine (PEPCID) 20 mg tablet    Multiple Vitamin (ONE-A-DAY MENS PO)    pantoprazole (PROTONIX) 40 mg tablet    No Known Allergies        Objective     There were  no vitals taken for this visit. There is no height or weight on file to calculate BMI.        PHYSICAL EXAM:      General Appearance:   Alert, cooperative, no distress   HEENT:   Normocephalic, atraumatic, anicteric.     Neck:  Supple, symmetrical, trachea midline   Lungs:   Clear to auscultation bilaterally; no rales, rhonchi or wheezing; respirations unlabored    Heart::   Regular rate and rhythm; no murmur, rub, or gallop.   Abdomen:   Soft, non-tender, non-distended; normal bowel sounds; no masses, no organomegaly    Genitalia:   Deferred    Rectal:   Deferred    Extremities:  No cyanosis, clubbing or edema    Pulses:  2+ and symmetric    Skin:  No jaundice, rashes, or lesions    Lymph nodes:  No palpable cervical lymphadenopathy        Lab Results:   No visits with results within 1 Day(s) from this visit.   Latest known visit with results is:   Admission on 07/03/2024, Discharged on 07/03/2024   Component Date Value    Ventricular Rate 07/03/2024 74     Atrial Rate 07/03/2024 74     MN Interval 07/03/2024 168     QRSD Interval 07/03/2024 94     QT Interval 07/03/2024 360     QTC Interval 07/03/2024 399     P Axis 07/03/2024 72     QRS Axis 07/03/2024 77     T Wave [place] 07/03/2024 70     WBC 07/03/2024 9.67     RBC 07/03/2024 4.77     Hemoglobin 07/03/2024 15.8     Hematocrit 07/03/2024 45.6     MCV 07/03/2024 96     MCH 07/03/2024 33.1     MCHC 07/03/2024 34.6     RDW 07/03/2024 12.2     MPV 07/03/2024 9.3     Platelets 07/03/2024 222     nRBC 07/03/2024 0     Segmented % 07/03/2024 76 (H)     Immature Grans % 07/03/2024 0     Lymphocytes % 07/03/2024 13 (L)     Monocytes % 07/03/2024 8     Eosinophils Relative 07/03/2024 3     Basophils Relative 07/03/2024 0     Absolute Neutrophils 07/03/2024 7.31     Absolute Immature Grans 07/03/2024 0.03     Absolute Lymphocytes 07/03/2024 1.22     Absolute Monocytes 07/03/2024 0.76     Eosinophils Absolute 07/03/2024 0.32     Basophils Absolute 07/03/2024 0.03     Sodium  07/03/2024 136     Potassium 07/03/2024 4.0     Chloride 07/03/2024 104     CO2 07/03/2024 26     ANION GAP 07/03/2024 6     BUN 07/03/2024 26 (H)     Creatinine 07/03/2024 1.22     Glucose 07/03/2024 97     Calcium 07/03/2024 9.1     AST 07/03/2024 18     ALT 07/03/2024 13     Alkaline Phosphatase 07/03/2024 38     Total Protein 07/03/2024 6.9     Albumin 07/03/2024 4.2     Total Bilirubin 07/03/2024 1.61 (H)     eGFR 07/03/2024 66     hs TnI 0hr 07/03/2024 7     hs TnI 2hr 07/03/2024 7     Delta 2hr hsTnI 07/03/2024 0          Radiology Results:   No results found.

## 2024-08-21 NOTE — PATIENT INSTRUCTIONS
Scheduled date of EGD(as of today): 8/26/24  Physician performing EGD: Dr. Vicente  Location of EGD: [facility]  Instructions reviewed with patient by: [email or web address]  Clearances:  N/A

## 2024-08-23 ENCOUNTER — ANESTHESIA (OUTPATIENT)
Dept: ANESTHESIOLOGY | Facility: HOSPITAL | Age: 54
End: 2024-08-23

## 2024-08-23 ENCOUNTER — ANESTHESIA EVENT (OUTPATIENT)
Dept: ANESTHESIOLOGY | Facility: HOSPITAL | Age: 54
End: 2024-08-23

## 2024-08-26 ENCOUNTER — ANESTHESIA EVENT (OUTPATIENT)
Dept: GASTROENTEROLOGY | Facility: AMBULARY SURGERY CENTER | Age: 54
End: 2024-08-26

## 2024-08-26 ENCOUNTER — HOSPITAL ENCOUNTER (OUTPATIENT)
Dept: GASTROENTEROLOGY | Facility: AMBULARY SURGERY CENTER | Age: 54
Setting detail: OUTPATIENT SURGERY
Discharge: HOME | End: 2024-08-26
Payer: COMMERCIAL

## 2024-08-26 ENCOUNTER — ANESTHESIA (OUTPATIENT)
Dept: GASTROENTEROLOGY | Facility: AMBULARY SURGERY CENTER | Age: 54
End: 2024-08-26

## 2024-08-26 VITALS
HEIGHT: 71 IN | DIASTOLIC BLOOD PRESSURE: 55 MMHG | BODY MASS INDEX: 24.5 KG/M2 | WEIGHT: 175 LBS | RESPIRATION RATE: 17 BRPM | SYSTOLIC BLOOD PRESSURE: 105 MMHG | OXYGEN SATURATION: 98 % | HEART RATE: 59 BPM | TEMPERATURE: 97.2 F

## 2024-08-26 DIAGNOSIS — K21.9 GERD (GASTROESOPHAGEAL REFLUX DISEASE): ICD-10-CM

## 2024-08-26 PROCEDURE — 88305 TISSUE EXAM BY PATHOLOGIST: CPT | Performed by: PATHOLOGY

## 2024-08-26 PROCEDURE — 43239 EGD BIOPSY SINGLE/MULTIPLE: CPT | Performed by: INTERNAL MEDICINE

## 2024-08-26 RX ORDER — PROPOFOL 10 MG/ML
INJECTION, EMULSION INTRAVENOUS AS NEEDED
Status: DISCONTINUED | OUTPATIENT
Start: 2024-08-26 | End: 2024-08-26

## 2024-08-26 RX ORDER — FENTANYL CITRATE 50 UG/ML
INJECTION, SOLUTION INTRAMUSCULAR; INTRAVENOUS AS NEEDED
Status: DISCONTINUED | OUTPATIENT
Start: 2024-08-26 | End: 2024-08-26

## 2024-08-26 RX ORDER — SODIUM CHLORIDE, SODIUM LACTATE, POTASSIUM CHLORIDE, CALCIUM CHLORIDE 600; 310; 30; 20 MG/100ML; MG/100ML; MG/100ML; MG/100ML
INJECTION, SOLUTION INTRAVENOUS CONTINUOUS PRN
Status: DISCONTINUED | OUTPATIENT
Start: 2024-08-26 | End: 2024-08-26

## 2024-08-26 RX ORDER — LIDOCAINE HYDROCHLORIDE 20 MG/ML
INJECTION, SOLUTION EPIDURAL; INFILTRATION; INTRACAUDAL; PERINEURAL AS NEEDED
Status: DISCONTINUED | OUTPATIENT
Start: 2024-08-26 | End: 2024-08-26

## 2024-08-26 RX ADMIN — PROPOFOL 50 MG: 10 INJECTION, EMULSION INTRAVENOUS at 09:15

## 2024-08-26 RX ADMIN — FENTANYL CITRATE 50 MCG: 50 INJECTION INTRAMUSCULAR; INTRAVENOUS at 09:11

## 2024-08-26 RX ADMIN — SODIUM CHLORIDE, SODIUM LACTATE, POTASSIUM CHLORIDE, AND CALCIUM CHLORIDE: .6; .31; .03; .02 INJECTION, SOLUTION INTRAVENOUS at 09:03

## 2024-08-26 RX ADMIN — LIDOCAINE HYDROCHLORIDE 100 MG: 20 INJECTION, SOLUTION EPIDURAL; INFILTRATION; INTRACAUDAL at 09:14

## 2024-08-26 RX ADMIN — PROPOFOL 50 MG: 10 INJECTION, EMULSION INTRAVENOUS at 09:18

## 2024-08-26 RX ADMIN — PROPOFOL 150 MG: 10 INJECTION, EMULSION INTRAVENOUS at 09:14

## 2024-08-26 NOTE — ANESTHESIA PREPROCEDURE EVALUATION
"Procedure:  EGD     - denies any chest pain, palpitations, shortness of breath, syncope, lightheadedness, seizures   - denies any recent infectious symptoms such as fevers, chills, cough   - denies taking any anticoagulation medications or any issues with bleeding, bruising, clotting    Relevant Problems   ANESTHESIA (within normal limits)      CARDIO (within normal limits)      ENDO (within normal limits)      GI/HEPATIC (within normal limits)      /RENAL   (+) Renal cyst      GYN (within normal limits)      HEMATOLOGY (within normal limits)      MUSCULOSKELETAL (within normal limits)      NEURO/PSYCH (within normal limits)      PULMONARY (within normal limits)      Oncology   (+) Testicular cancer (HCC)      Lab Results   Component Value Date    WBC 9.67 07/03/2024    HGB 15.8 07/03/2024    HCT 45.6 07/03/2024    MCV 96 07/03/2024     07/03/2024     Lab Results   Component Value Date     10/25/2016    SODIUM 136 07/03/2024    K 4.0 07/03/2024     07/03/2024    CO2 26 07/03/2024    AGAP 6 07/03/2024    BUN 26 (H) 07/03/2024    CREATININE 1.22 07/03/2024    GLUC 97 07/03/2024    GLUF 87 02/23/2024    CALCIUM 9.1 07/03/2024    AST 18 07/03/2024    ALT 13 07/03/2024    ALKPHOS 38 07/03/2024    PROT 6.6 10/25/2016    TP 6.9 07/03/2024    BILITOT 1.0 10/25/2016    TBILI 1.61 (H) 07/03/2024    EGFR 66 07/03/2024     No results found for: \"PTT\"  No results found for: \"INR\", \"PROTIME\"    Physical Exam    Airway    Mallampati score: II  TM Distance: >3 FB  Neck ROM: full     Dental       Cardiovascular  Rhythm: regular, Rate: normal, Cardiovascular exam normal    Pulmonary  Pulmonary exam normal Breath sounds clear to auscultation    Other Findings        Anesthesia Plan  ASA Score- 2     Anesthesia Type- IV sedation with anesthesia with ASA Monitors.         Additional Monitors:     Airway Plan:            Plan Factors-    Chart reviewed. EKG reviewed.  Existing labs reviewed. Patient summary " reviewed.    Patient is not a current smoker.  Patient did not smoke on day of surgery.    Obstructive sleep apnea risk education given perioperatively.        Induction- intravenous.    Postoperative Plan-         Informed Consent- Anesthetic plan and risks discussed with patient.  I personally reviewed this patient with the CRNA. Discussed and agreed on the Anesthesia Plan with the CRNA..

## 2024-08-26 NOTE — INTERVAL H&P NOTE
H&P reviewed. After examining the patient I find no changes in the patients condition since the H&P had been written.    Vitals:    08/26/24 0819   BP: 122/68   Pulse: 65   Resp: 16   Temp: (!) 97.1 °F (36.2 °C)   SpO2: 99%

## 2024-08-26 NOTE — ANESTHESIA POSTPROCEDURE EVALUATION
Post-Op Assessment Note    CV Status:  Stable  Pain Score: 0    Pain management: adequate       Mental Status:  Awake and alert   Hydration Status:  Stable   PONV Controlled:  None   Airway Patency:  Patent and adequate     Post Op Vitals Reviewed: Yes    No anethesia notable event occurred.    Staff: CRNA, Anesthesiologist               BP   108/58   Temp      Pulse  62   Resp   16   SpO2   97%

## 2024-08-28 PROCEDURE — 88305 TISSUE EXAM BY PATHOLOGIST: CPT | Performed by: PATHOLOGY

## 2025-05-30 ENCOUNTER — OFFICE VISIT (OUTPATIENT)
Dept: FAMILY MEDICINE CLINIC | Facility: CLINIC | Age: 55
End: 2025-05-30
Payer: COMMERCIAL

## 2025-05-30 VITALS
SYSTOLIC BLOOD PRESSURE: 118 MMHG | HEART RATE: 56 BPM | BODY MASS INDEX: 25.17 KG/M2 | OXYGEN SATURATION: 97 % | HEIGHT: 71 IN | RESPIRATION RATE: 15 BRPM | TEMPERATURE: 97.5 F | DIASTOLIC BLOOD PRESSURE: 76 MMHG | WEIGHT: 179.8 LBS

## 2025-05-30 DIAGNOSIS — Z13.228 SCREENING FOR METABOLIC DISORDER: ICD-10-CM

## 2025-05-30 DIAGNOSIS — Z11.1 SCREENING FOR TUBERCULOSIS: ICD-10-CM

## 2025-05-30 DIAGNOSIS — Z13.220 SCREENING FOR CHOLESTEROL LEVEL: ICD-10-CM

## 2025-05-30 DIAGNOSIS — Z13.89 SCREENING FOR BLOOD OR PROTEIN IN URINE: ICD-10-CM

## 2025-05-30 DIAGNOSIS — Z23 ENCOUNTER FOR IMMUNIZATION: ICD-10-CM

## 2025-05-30 DIAGNOSIS — Z13.29 SCREENING FOR THYROID DISORDER: ICD-10-CM

## 2025-05-30 DIAGNOSIS — Z13.0 SCREENING FOR BLOOD DISEASE: ICD-10-CM

## 2025-05-30 DIAGNOSIS — R91.1 LUNG NODULE: ICD-10-CM

## 2025-05-30 DIAGNOSIS — Z13.1 SCREENING FOR DIABETES MELLITUS: ICD-10-CM

## 2025-05-30 DIAGNOSIS — Z12.5 PROSTATE CANCER SCREENING: ICD-10-CM

## 2025-05-30 DIAGNOSIS — Z00.00 ANNUAL PHYSICAL EXAM: Primary | ICD-10-CM

## 2025-05-30 PROCEDURE — 90715 TDAP VACCINE 7 YRS/> IM: CPT

## 2025-05-30 PROCEDURE — 99396 PREV VISIT EST AGE 40-64: CPT | Performed by: FAMILY MEDICINE

## 2025-05-30 PROCEDURE — 90471 IMMUNIZATION ADMIN: CPT

## 2025-05-30 PROCEDURE — 86580 TB INTRADERMAL TEST: CPT

## 2025-05-30 NOTE — PROGRESS NOTES
Adult Annual Physical  Name: Juanjo Wilkerson      : 1970      MRN: [id number]  Encounter Provider: Shaina Ryder MD  Encounter Date: 2025   Encounter department: [facility]    :  Assessment & Plan  Annual physical exam  - Tdap due today, also discussed Shingrix  - screening labs ordered and we will call with the results once available       - PSA, Total Screen       - CBC and differential       - Lipid Panel with Direct LDL reflex       - TSH, 3rd generation with Free T4 reflex       - Comprehensive metabolic panel       - UA (URINE) with reflex to Scope     Encounter for immunization  Orders:    TDAP VACCINE GREATER THAN OR EQUAL TO 8YO IM    Screening for tuberculosis  Orders:    TB Skin Test    Lung nodule  - CTA chest, abdomen and pelvis 7/3/24 showed 3 mm right upper lobe nodule, will repeat CT chest in 2025    Orders:    CT chest wo contrast; Future    Preventive Screenings:  - Diabetes Screening: screening up-to-date  - Hepatitis C screening: screening up-to-date   - Colon cancer screening: screening up-to-date   - Lung cancer screening: screening not indicated   - Prostate cancer screening: orders placed     Immunizations:  - Immunizations due: Zoster (Shingrix)    Counseling/Anticipatory Guidance:    - Dental health: discussed importance of regular tooth brushing, flossing, and dental visits.   - Diet: discussed recommendations for a healthy/well-balanced diet.   - Exercise: the importance of regular exercise/physical activity was discussed. Recommend exercise 3-5 times per week for at least 30 minutes.   - Injury prevention: discussed safety/seat belts, safety helmets, smoke detectors, carbon monoxide detectors, and smoking near bedding or upholstery.          History of Present Illness     Adult Annual Physical:  Patient presents for annual physical.     Diet and Physical Activity:  - Diet/Nutrition: well balanced diet.  - Exercise:  "moderate cardiovascular exercise and 3-4 times a week on average.    Depression Screening:  - PHQ-2 Score: 0    General Health:  - Sleep: 7-8 hours of sleep on average.  - Hearing: normal hearing bilateral ears.  - Vision:. goes for regular eye exams  - Dental: regular dental visits.     Health:    - Urinary symptoms: none.     Review of Systems   Constitutional:  Negative for appetite change, chills, fatigue, fever and unexpected weight change.   HENT:  Negative for congestion, ear discharge, ear pain, rhinorrhea, sore throat and trouble swallowing.    Eyes:  Negative for pain, discharge, redness, itching and visual disturbance.   Respiratory:  Negative for cough, shortness of breath and wheezing.    Cardiovascular:  Negative for chest pain, palpitations and leg swelling.   Gastrointestinal:  Negative for abdominal pain, blood in stool, constipation, diarrhea, nausea and vomiting.   Endocrine: Negative for cold intolerance, heat intolerance, polydipsia and polyuria.   Genitourinary:  Negative for dysuria, frequency, hematuria, testicular pain and urgency.   Musculoskeletal:  Negative for arthralgias, gait problem, joint swelling and myalgias.   Skin:  Negative for rash.   Neurological:  Negative for dizziness, syncope, weakness, numbness and headaches.   Hematological:  Negative for adenopathy.   Psychiatric/Behavioral:  Negative for dysphoric mood and sleep disturbance. The patient is not nervous/anxious.          Objective   /76   Pulse 56   Temp 97.5 °F (36.4 °C)   Resp 15   Ht 5' 11\" (1.803 m)   Wt 81.6 kg (179 lb 12.8 oz)   SpO2 97%   BMI 25.08 kg/m²     Physical Exam  Constitutional:       General: He is not in acute distress.     Appearance: Normal appearance. He is well-developed.   HENT:      Right Ear: Tympanic membrane, ear canal and external ear normal.      Left Ear: Tympanic membrane, ear canal and external ear normal.      Mouth/Throat:      Mouth: Mucous membranes are moist.      " Pharynx: Oropharynx is clear.     Eyes:      Extraocular Movements: Extraocular movements intact.      Conjunctiva/sclera: Conjunctivae normal.      Pupils: Pupils are equal, round, and reactive to light.     Neck:      Thyroid: No thyromegaly.     Cardiovascular:      Rate and Rhythm: Normal rate and regular rhythm.      Pulses: Normal pulses.      Heart sounds: Normal heart sounds. No murmur heard.  Pulmonary:      Effort: Pulmonary effort is normal.      Breath sounds: Normal breath sounds. No wheezing or rales.   Abdominal:      General: Abdomen is flat. Bowel sounds are normal.      Palpations: Abdomen is soft. There is no mass.      Tenderness: There is no abdominal tenderness. There is no rebound.     Musculoskeletal:         General: Normal range of motion.      Cervical back: Normal range of motion and neck supple.   Lymphadenopathy:      Cervical: No cervical adenopathy.     Skin:     General: Skin is warm.     Neurological:      General: No focal deficit present.      Mental Status: He is alert and oriented to person, place, and time.      Cranial Nerves: No cranial nerve deficit.      Deep Tendon Reflexes: Reflexes normal.     Psychiatric:         Mood and Affect: Mood normal.         Behavior: Behavior normal.         Thought Content: Thought content normal.         Judgment: Judgment normal.

## 2025-06-02 ENCOUNTER — APPOINTMENT (OUTPATIENT)
Dept: LAB | Facility: CLINIC | Age: 55
End: 2025-06-02
Payer: COMMERCIAL

## 2025-06-02 ENCOUNTER — CLINICAL SUPPORT (OUTPATIENT)
Dept: FAMILY MEDICINE CLINIC | Facility: CLINIC | Age: 55
End: 2025-06-02

## 2025-06-02 DIAGNOSIS — Z13.0 SCREENING FOR BLOOD DISEASE: ICD-10-CM

## 2025-06-02 DIAGNOSIS — Z13.228 SCREENING FOR METABOLIC DISORDER: ICD-10-CM

## 2025-06-02 DIAGNOSIS — Z13.29 SCREENING FOR THYROID DISORDER: ICD-10-CM

## 2025-06-02 DIAGNOSIS — Z12.5 PROSTATE CANCER SCREENING: ICD-10-CM

## 2025-06-02 DIAGNOSIS — Z11.1 ENCOUNTER FOR PPD SKIN TEST READING: Primary | ICD-10-CM

## 2025-06-02 DIAGNOSIS — Z13.220 SCREENING FOR CHOLESTEROL LEVEL: ICD-10-CM

## 2025-06-02 DIAGNOSIS — Z13.1 SCREENING FOR DIABETES MELLITUS: ICD-10-CM

## 2025-06-02 DIAGNOSIS — Z13.89 SCREENING FOR BLOOD OR PROTEIN IN URINE: ICD-10-CM

## 2025-06-02 LAB
ALBUMIN SERPL BCG-MCNC: 4.2 G/DL (ref 3.5–5)
ALP SERPL-CCNC: 42 U/L (ref 34–104)
ALT SERPL W P-5'-P-CCNC: 19 U/L (ref 7–52)
ANION GAP SERPL CALCULATED.3IONS-SCNC: 10 MMOL/L (ref 4–13)
AST SERPL W P-5'-P-CCNC: 21 U/L (ref 13–39)
BASOPHILS # BLD AUTO: 0.04 THOUSANDS/ÂΜL (ref 0–0.1)
BASOPHILS NFR BLD AUTO: 1 % (ref 0–1)
BILIRUB SERPL-MCNC: 1.42 MG/DL (ref 0.2–1)
BILIRUB UR QL STRIP: NEGATIVE
BUN SERPL-MCNC: 21 MG/DL (ref 5–25)
CALCIUM SERPL-MCNC: 9.1 MG/DL (ref 8.4–10.2)
CHLORIDE SERPL-SCNC: 101 MMOL/L (ref 96–108)
CHOLEST SERPL-MCNC: 145 MG/DL (ref ?–200)
CLARITY UR: CLEAR
CO2 SERPL-SCNC: 27 MMOL/L (ref 21–32)
COLOR UR: NORMAL
CREAT SERPL-MCNC: 1.23 MG/DL (ref 0.6–1.3)
EOSINOPHIL # BLD AUTO: 0.11 THOUSAND/ÂΜL (ref 0–0.61)
EOSINOPHIL NFR BLD AUTO: 2 % (ref 0–6)
ERYTHROCYTE [DISTWIDTH] IN BLOOD BY AUTOMATED COUNT: 12.5 % (ref 11.6–15.1)
GFR SERPL CREATININE-BSD FRML MDRD: 65 ML/MIN/1.73SQ M
GLUCOSE P FAST SERPL-MCNC: 99 MG/DL (ref 65–99)
GLUCOSE UR STRIP-MCNC: NEGATIVE MG/DL
HCT VFR BLD AUTO: 50 % (ref 36.5–49.3)
HDLC SERPL-MCNC: 57 MG/DL
HGB BLD-MCNC: 17.1 G/DL (ref 12–17)
HGB UR QL STRIP.AUTO: NEGATIVE
IMM GRANULOCYTES # BLD AUTO: 0.02 THOUSAND/UL (ref 0–0.2)
IMM GRANULOCYTES NFR BLD AUTO: 0 % (ref 0–2)
INDURATION: 0 MM
KETONES UR STRIP-MCNC: NEGATIVE MG/DL
LDLC SERPL CALC-MCNC: 71 MG/DL (ref 0–100)
LEUKOCYTE ESTERASE UR QL STRIP: NEGATIVE
LYMPHOCYTES # BLD AUTO: 1.39 THOUSANDS/ÂΜL (ref 0.6–4.47)
LYMPHOCYTES NFR BLD AUTO: 21 % (ref 14–44)
MCH RBC QN AUTO: 33.5 PG (ref 26.8–34.3)
MCHC RBC AUTO-ENTMCNC: 34.2 G/DL (ref 31.4–37.4)
MCV RBC AUTO: 98 FL (ref 82–98)
MONOCYTES # BLD AUTO: 0.74 THOUSAND/ÂΜL (ref 0.17–1.22)
MONOCYTES NFR BLD AUTO: 11 % (ref 4–12)
NEUTROPHILS # BLD AUTO: 4.28 THOUSANDS/ÂΜL (ref 1.85–7.62)
NEUTS SEG NFR BLD AUTO: 65 % (ref 43–75)
NITRITE UR QL STRIP: NEGATIVE
NRBC BLD AUTO-RTO: 0 /100 WBCS
PH UR STRIP.AUTO: 6 [PH]
PLATELET # BLD AUTO: 266 THOUSANDS/UL (ref 149–390)
PMV BLD AUTO: 9.7 FL (ref 8.9–12.7)
POTASSIUM SERPL-SCNC: 4.6 MMOL/L (ref 3.5–5.3)
PROT SERPL-MCNC: 6.7 G/DL (ref 6.4–8.4)
PROT UR STRIP-MCNC: NEGATIVE MG/DL
PSA SERPL-MCNC: 0.61 NG/ML (ref 0–4)
RBC # BLD AUTO: 5.11 MILLION/UL (ref 3.88–5.62)
SODIUM SERPL-SCNC: 138 MMOL/L (ref 135–147)
SP GR UR STRIP.AUTO: 1.02 (ref 1–1.03)
TB SKIN TEST: NEGATIVE
TRIGL SERPL-MCNC: 85 MG/DL (ref ?–150)
TSH SERPL DL<=0.05 MIU/L-ACNC: 1.59 UIU/ML (ref 0.45–4.5)
UROBILINOGEN UR STRIP-ACNC: <2 MG/DL
WBC # BLD AUTO: 6.58 THOUSAND/UL (ref 4.31–10.16)

## 2025-06-02 PROCEDURE — 80061 LIPID PANEL: CPT

## 2025-06-02 PROCEDURE — 85025 COMPLETE CBC W/AUTO DIFF WBC: CPT

## 2025-06-02 PROCEDURE — 81003 URINALYSIS AUTO W/O SCOPE: CPT

## 2025-06-02 PROCEDURE — 80053 COMPREHEN METABOLIC PANEL: CPT

## 2025-06-02 PROCEDURE — 36415 COLL VENOUS BLD VENIPUNCTURE: CPT

## 2025-06-02 PROCEDURE — NURSE

## 2025-06-02 PROCEDURE — G0103 PSA SCREENING: HCPCS

## 2025-06-02 PROCEDURE — 84443 ASSAY THYROID STIM HORMONE: CPT

## 2025-06-04 ENCOUNTER — RESULTS FOLLOW-UP (OUTPATIENT)
Dept: FAMILY MEDICINE CLINIC | Facility: CLINIC | Age: 55
End: 2025-06-04

## 2025-06-11 ENCOUNTER — TELEPHONE (OUTPATIENT)
Age: 55
End: 2025-06-11

## 2025-06-11 NOTE — TELEPHONE ENCOUNTER
Patient wife called to see if patient called in regards to her picking up paperwork but he has not done so yet.  She will have him call.  
,